# Patient Record
Sex: MALE | Race: WHITE | NOT HISPANIC OR LATINO | Employment: OTHER | RURAL
[De-identification: names, ages, dates, MRNs, and addresses within clinical notes are randomized per-mention and may not be internally consistent; named-entity substitution may affect disease eponyms.]

---

## 2017-01-09 ENCOUNTER — OFFICE VISIT (OUTPATIENT)
Dept: FAMILY MEDICINE CLINIC | Facility: CLINIC | Age: 77
End: 2017-01-09

## 2017-01-09 VITALS
HEIGHT: 72 IN | SYSTOLIC BLOOD PRESSURE: 116 MMHG | DIASTOLIC BLOOD PRESSURE: 80 MMHG | BODY MASS INDEX: 23 KG/M2 | HEART RATE: 65 BPM | WEIGHT: 169.8 LBS | OXYGEN SATURATION: 95 % | TEMPERATURE: 97.2 F

## 2017-01-09 DIAGNOSIS — S01.301A OPEN WOUND OF RIGHT EAR, UNSPECIFIED OPEN WOUND TYPE, INITIAL ENCOUNTER: ICD-10-CM

## 2017-01-09 DIAGNOSIS — S01.302A OPEN WOUND OF LEFT EAR, UNSPECIFIED OPEN WOUND TYPE, INITIAL ENCOUNTER: Primary | ICD-10-CM

## 2017-01-09 PROCEDURE — 99213 OFFICE O/P EST LOW 20 MIN: CPT | Performed by: NURSE PRACTITIONER

## 2017-01-09 NOTE — MR AVS SNAPSHOT
Larry Santos   1/9/2017 2:00 PM   Office Visit    Dept Phone:  [phone]   Encounter #:  [id number]    Provider:  ISAAC Andre   Department:  [facility] FAMILY MEDICINE                Your Full Care Plan              Today's Medication Changes          These changes are accurate as of: 1/9/17  3:05 PM.  If you have any questions, ask your nurse or doctor.               New Medication(s)Ordered:     mupirocin 2 % ointment   Commonly known as:  BACTROBAN   Apply  topically 3 (Three) Times a Day.   Started by:  ISAAC Andre            Where to Get Your Medications      These medications were sent to [place], [place] - [address] - [phone] [facility] [phone]   [address] [facility] [address] [facility] [address]     Phone:  [phone]     mupirocin 2 % ointment                  Your Updated Medication List          This list is accurate as of: 1/9/17  3:05 PM.  Always use your most recent med list.                aspirin 81 MG EC tablet       atorvastatin 10 MG tablet   Commonly known as:  LIPITOR   Take 1 tablet by mouth Daily.       carvedilol 6.25 MG tablet   Commonly known as:  COREG   Take 1 tablet by mouth 2 (Two) Times a Day.       hydrochlorothiazide 25 MG tablet   Commonly known as:  HYDRODIURIL       mupirocin 2 % ointment   Commonly known as:  BACTROBAN   Apply  topically 3 (Three) Times a Day.       ramipril 5 MG capsule   Commonly known as:  ALTACE   Take 1 capsule by mouth Daily.               You Were Diagnosed With        Codes Comments    Open wound of left ear, unspecified open wound type, initial encounter    -  Primary ICD-10-CM: S01.302A  ICD-9-CM: 872.8     Open wound of right ear, unspecified open wound type, initial encounter     ICD-10-CM: S01.301A  ICD-9-CM: 872.8       Instructions     None    Patient Instructions History      Upcoming Appointments     Visit Type Date Time Department    OFFICE VISIT 1/9/2017   2:00 PM [facility]    FOLLOW UP 2017 11:00 AM [facility]    FOLLOW UP 2017  8:40 AM [facility]    FOLLOW UP 2017 11:15 AM [facility] Signup     [facility] allows you to send messages to your doctor, view your test results, renew your prescriptions, schedule appointments, and more. To sign up, go to [employer] and click on the Sign Up Now link in the New User? box. Enter your [facility] Activation Code exactly as it appears below along with the last four digits of your Social Security Number and your Date of Birth () to complete the sign-up process. If you do not sign up before the expiration date, you must request a new code.    [facility] Activation Code: [email or web address]  Expires: 2017  3:05 PM    If you have questions, you can email [email or web address] or call [phone] to talk to our [facility] staff. Remember, [facility] is NOT to be used for urgent needs. For medical emergencies, dial 911.               Other Info from Your Visit           Your Appointments     2017 11:00 AM EST   Follow Up with ISAAC Andre   [facility] FAMILY MEDICINE (--)    [address] [address]   [phone]           Arrive 15 minutes prior to appointment.            2017  8:40 AM EDT   Follow Up with ISAAC Andre   [facility] FAMILY MEDICINE (--)    [address] [address]   [phone]           Arrive 15 minutes prior to appointment.            2017 11:15 AM EDT   Follow Up with Delonte Ramirez MD   [facility] CARDIOLOGY (--)    [address]  [place] [address]   [phone]           Arrive 15 minutes prior to appointment.              Allergies     Penicillins        Reason for Visit     ear lesion           Vital Signs     Blood Pressure Pulse Temperature Height  "Weight Oxygen Saturation    116/80 (BP Location: Left arm, Patient Position: Sitting) 65 97.2 °F (36.2 °C) (Oral) 72\" (182.9 cm) 169 lb 12.8 oz (77 kg) 95%    Body Mass Index Smoking Status                23.03 kg/m2 Former Smoker          Problems and Diagnoses Noted     Open wound of ear    -  Primary    Open wound of ear            "

## 2017-01-09 NOTE — PROGRESS NOTES
"Subjective   Larry Santos is a 76 y.o. male.   Chief Complaint   Patient presents with   • ear lesion     History of Present Illness     The patient presents today with c/o bilateral ear lesion. This is located on the posterior auricle of bilateral ears. Originally started as a \"pimple.\" Denies trauma. Reports he squeezed and drained the lesion multiple times. The pain has progressed. He has been washing vigorously at home multiple times a day. Has noted clear and bloody drainage. He denies fever and chills. No malaise or fatigue. Has not tried any treatment. Overall, this is worsening.    The following portions of the patient's history were reviewed and updated as appropriate: allergies, current medications, past family history, past medical history, past social history, past surgical history and problem list.  Visit Vitals   • /80 (BP Location: Left arm, Patient Position: Sitting)   • Pulse 65   • Temp 97.2 °F (36.2 °C) (Oral)   • Ht 72\" (182.9 cm)   • Wt 169 lb 12.8 oz (77 kg)   • SpO2 95%  Comment: Room air   • BMI 23.03 kg/m2     Review of Systems   Constitutional: Negative for chills, fatigue and fever.   HENT: Negative for congestion, ear pain, rhinorrhea and sore throat.    Respiratory: Negative for cough, shortness of breath and wheezing.    Cardiovascular: Negative for chest pain, palpitations and leg swelling.   Gastrointestinal: Negative for abdominal pain, diarrhea, nausea and vomiting.   Musculoskeletal: Negative for arthralgias and myalgias.   Skin: Positive for wound. Negative for rash.   Neurological: Negative for dizziness, light-headedness and headaches.   Psychiatric/Behavioral: Negative for sleep disturbance. The patient is not nervous/anxious.        Objective   Physical Exam   Constitutional: He is oriented to person, place, and time. He appears well-developed and well-nourished.   HENT:   Head: Normocephalic and atraumatic.   Eyes: Pupils are equal, round, and reactive to light. "   Cardiovascular: Normal rate, regular rhythm and normal heart sounds.    Pulmonary/Chest: Effort normal and breath sounds normal.   Abdominal: Soft. Bowel sounds are normal.   Musculoskeletal: Normal range of motion.   Neurological: He is alert and oriented to person, place, and time.   Skin:   Erythema and abrasion noted to bilateral posterior ear auricle. Scant serosanguineous drainage noted   Psychiatric: He has a normal mood and affect. His behavior is normal.       Assessment/Plan   Larry was seen today for ear lesion.    Diagnoses and all orders for this visit:    Open wound of left ear, unspecified open wound type, initial encounter    Open wound of right ear, unspecified open wound type, initial encounter    Other orders  -     mupirocin (BACTROBAN) 2 % ointment; Apply  topically 3 (Three) Times a Day.        Will treat with topical Bactroban ointment. Otherwise, keep the area clean and dry. Avoid vigorous scrubbing. Will follow up in 3 days and as needed.

## 2017-01-13 ENCOUNTER — OFFICE VISIT (OUTPATIENT)
Dept: FAMILY MEDICINE CLINIC | Facility: CLINIC | Age: 77
End: 2017-01-13

## 2017-01-13 VITALS
BODY MASS INDEX: 23.07 KG/M2 | DIASTOLIC BLOOD PRESSURE: 87 MMHG | HEIGHT: 72 IN | OXYGEN SATURATION: 96 % | SYSTOLIC BLOOD PRESSURE: 134 MMHG | WEIGHT: 170.3 LBS | TEMPERATURE: 97.6 F | HEART RATE: 67 BPM

## 2017-01-13 DIAGNOSIS — S01.302D OPEN WOUND OF LEFT EAR, UNSPECIFIED OPEN WOUND TYPE, SUBSEQUENT ENCOUNTER: Primary | ICD-10-CM

## 2017-01-13 DIAGNOSIS — S01.301D OPEN WOUND OF RIGHT EAR, UNSPECIFIED OPEN WOUND TYPE, SUBSEQUENT ENCOUNTER: ICD-10-CM

## 2017-01-13 PROCEDURE — 99213 OFFICE O/P EST LOW 20 MIN: CPT | Performed by: NURSE PRACTITIONER

## 2017-01-13 NOTE — MR AVS SNAPSHOT
Larry DEL VALLE Tom   2017 11:00 AM   Office Visit    Dept Phone:  [phone]   Encounter #:  [id number]    Provider:  ISAAC Andre   Department:  [facility] FAMILY MEDICINE                Your Full Care Plan              Your Updated Medication List          This list is accurate as of: 17 11:08 AM.  Always use your most recent med list.                aspirin 81 MG EC tablet       atorvastatin 10 MG tablet   Commonly known as:  LIPITOR   Take 1 tablet by mouth Daily.       carvedilol 6.25 MG tablet   Commonly known as:  COREG   Take 1 tablet by mouth 2 (Two) Times a Day.       hydrochlorothiazide 25 MG tablet   Commonly known as:  HYDRODIURIL       mupirocin 2 % ointment   Commonly known as:  BACTROBAN   Apply  topically 3 (Three) Times a Day.       ramipril 5 MG capsule   Commonly known as:  ALTACE   Take 1 capsule by mouth Daily.               You Were Diagnosed With        Codes Comments    Open wound of left ear, unspecified open wound type, subsequent encounter    -  Primary ICD-10-CM: S01.302D  ICD-9-CM: 872.8     Open wound of right ear, unspecified open wound type, subsequent encounter     ICD-10-CM: S01.301D  ICD-9-CM: 872.8       Instructions     None    Patient Instructions History      Upcoming Appointments     Visit Type Date Time Department    FOLLOW UP 2017 11:00 AM [facility]    FOLLOW UP 2017  8:40 AM [facility]    FOLLOW UP 2017 11:15 AM [facility] TRESSA RAMACHANDRAN      [facility] Signup     [facility] [employer] allows you to send messages to your doctor, view your test results, renew your prescriptions, schedule appointments, and more. To sign up, go to [employer] and click on the Sign Up Now link in the New User? box. Enter your [employer] Activation Code exactly as it appears below along with the last four digits of your Social Security Number and your Date of Birth () to complete the sign-up  "process. If you do not sign up before the expiration date, you must request a new code.    MyChart Activation Code: [email or web address]  Expires: 1/23/2017  3:05 PM    If you have questions, you can email [email or web address] or call [phone] to talk to our MyChart staff. Remember, MyChart is NOT to be used for urgent needs. For medical emergencies, dial 911.               Other Info from Your Visit           Your Appointments     Apr 14, 2017  8:40 AM EDT   Follow Up with ISAAC Andre   [facility] FAMILY MEDICINE (--)    [address] [address] [phone]           Arrive 15 minutes prior to appointment.            Jul 06, 2017 11:15 AM EDT   Follow Up with Delonte Ramirez MD   [facility] CARDIOLOGY (--)    [address] [address] [phone]           Arrive 15 minutes prior to appointment.              Allergies     Penicillins        Reason for Visit     Wound Check           Vital Signs     Blood Pressure Pulse Temperature Height Weight Oxygen Saturation    134/87 (BP Location: Left arm, Patient Position: Sitting) 67 97.6 °F (36.4 °C) (Oral) 72\" (182.9 cm) 170 lb 4.8 oz (77.2 kg) 96%    Body Mass Index Smoking Status                23.1 kg/m2 Former Smoker          Problems and Diagnoses Noted     Open wound of ear    -  Primary    Open wound of ear            "

## 2017-01-13 NOTE — PROGRESS NOTES
"Subjective   Larry Santos is a 76 y.o. male.   Chief Complaint   Patient presents with   • Wound Check     History of Present Illness     The patient presents today for follow-up regarding open wounds on the posterior aspect of bilateral ear auricle.  He has been applying the Bactroban cream routinely.  Reports the wounds haven't nearly resolved.  Has had no drainage from the right ear.  Has had a scant amount of drainage from the left ear.  Is keeping the area clean and dry.  Denies fever and chills.  Overall, this is improving.    The following portions of the patient's history were reviewed and updated as appropriate: allergies, current medications, past family history, past medical history, past social history, past surgical history and problem list.  Visit Vitals   • /87 (BP Location: Left arm, Patient Position: Sitting)   • Pulse 67   • Temp 97.6 °F (36.4 °C) (Oral)   • Ht 72\" (182.9 cm)   • Wt 170 lb 4.8 oz (77.2 kg)   • SpO2 96%  Comment: Room air   • BMI 23.1 kg/m2     Review of Systems   Constitutional: Negative for chills, fatigue and fever.   HENT: Negative for congestion, ear pain, rhinorrhea and sore throat.    Respiratory: Negative for cough, shortness of breath and wheezing.    Cardiovascular: Negative for chest pain, palpitations and leg swelling.   Gastrointestinal: Negative for abdominal pain, diarrhea, nausea and vomiting.   Musculoskeletal: Negative for back pain and myalgias.   Skin: Positive for wound. Negative for rash.   Neurological: Negative for dizziness, light-headedness and headaches.   Psychiatric/Behavioral: Negative for sleep disturbance. The patient is not nervous/anxious.        Objective   Physical Exam   Constitutional: He is oriented to person, place, and time. He appears well-developed and well-nourished.   HENT:   Head: Normocephalic and atraumatic.   Pulmonary/Chest: Effort normal.   Neurological: He is alert and oriented to person, place, and time.   Skin: Skin is " warm and dry.   Abrasion and erythema noted to bilateral posterior ear auricle. No drainage noted.   Psychiatric: He has a normal mood and affect. His behavior is normal.       Assessment/Plan   Larry was seen today for wound check.    Diagnoses and all orders for this visit:    Open wound of left ear, unspecified open wound type, subsequent encounter    Open wound of right ear, unspecified open wound type, subsequent encounter      The wound is healing well. Will continue the current medication. Continue to keep the ears clean and dry. Follow up as scheduled and as needed.

## 2017-03-16 ENCOUNTER — OFFICE VISIT (OUTPATIENT)
Dept: FAMILY MEDICINE CLINIC | Facility: CLINIC | Age: 77
End: 2017-03-16

## 2017-03-16 VITALS
DIASTOLIC BLOOD PRESSURE: 78 MMHG | HEART RATE: 77 BPM | SYSTOLIC BLOOD PRESSURE: 120 MMHG | WEIGHT: 165 LBS | BODY MASS INDEX: 22.35 KG/M2 | HEIGHT: 72 IN | TEMPERATURE: 97.6 F | OXYGEN SATURATION: 94 %

## 2017-03-16 DIAGNOSIS — J44.1 COPD WITH ACUTE EXACERBATION (HCC): Primary | ICD-10-CM

## 2017-03-16 PROCEDURE — 99213 OFFICE O/P EST LOW 20 MIN: CPT | Performed by: NURSE PRACTITIONER

## 2017-03-16 RX ORDER — DOXYCYCLINE HYCLATE 100 MG/1
100 CAPSULE ORAL 2 TIMES DAILY
Qty: 20 CAPSULE | Refills: 0 | Status: SHIPPED | OUTPATIENT
Start: 2017-03-16 | End: 2017-04-14

## 2017-03-16 NOTE — PROGRESS NOTES
"Latrell Santos is a 76 y.o. male.   Chief Complaint   Patient presents with   • URI     URI    This is a new problem. The current episode started in the past 7 days. The problem has been waxing and waning. There has been no fever. Associated symptoms include congestion, coughing, headaches, rhinorrhea and a sore throat. Pertinent negatives include no abdominal pain, chest pain, diarrhea, dysuria, ear pain, nausea, rash, sinus pain, swollen glands, vomiting or wheezing. He has tried nothing for the symptoms. The treatment provided no relief.        The following portions of the patient's history were reviewed and updated as appropriate: allergies, current medications, past family history, past medical history, past social history, past surgical history and problem list.  Visit Vitals   • /78 (BP Location: Right arm, Patient Position: Sitting)   • Pulse 77   • Temp 97.6 °F (36.4 °C) (Oral)   • Ht 72\" (182.9 cm)   • Wt 165 lb (74.8 kg)   • SpO2 94%  Comment: Room air   • BMI 22.38 kg/m2     Review of Systems   Constitutional: Negative for chills, fatigue and fever.   HENT: Positive for congestion, rhinorrhea and sore throat. Negative for ear pain.    Respiratory: Positive for cough. Negative for shortness of breath and wheezing.    Cardiovascular: Negative for chest pain, palpitations and leg swelling.   Gastrointestinal: Negative for abdominal pain, diarrhea, nausea and vomiting.   Genitourinary: Negative for dysuria.   Musculoskeletal: Negative for back pain and myalgias.   Skin: Negative for rash and wound.   Neurological: Positive for headaches. Negative for dizziness and light-headedness.   Psychiatric/Behavioral: Negative for sleep disturbance. The patient is not nervous/anxious.        Objective   Physical Exam   Constitutional: He is oriented to person, place, and time. He appears well-developed and well-nourished.   HENT:   Head: Normocephalic and atraumatic.   Right Ear: External ear " normal.   Left Ear: External ear normal.   Oropharynx with PND   Cardiovascular: Normal rate, regular rhythm and normal heart sounds.    Pulmonary/Chest: Effort normal and breath sounds normal.   Abdominal: Soft. Bowel sounds are normal.   Musculoskeletal: Normal range of motion.   Lymphadenopathy:     He has no cervical adenopathy.   Neurological: He is alert and oriented to person, place, and time.   Skin: Skin is warm and dry.   Psychiatric: He has a normal mood and affect. His behavior is normal.       Assessment/Plan   Larry was seen today for uri.    Diagnoses and all orders for this visit:    COPD with acute exacerbation  -     doxycycline (VIBRAMYCIN) 100 MG capsule; Take 1 capsule by mouth 2 (Two) Times a Day.        Will treat with antibiotic as noted. He has tolerated well in the past. Administration and SE discussed. Supportive measures encouraged. Follow up in 2-3 days if no improvement or if symptoms worsen.

## 2017-04-14 ENCOUNTER — OFFICE VISIT (OUTPATIENT)
Dept: FAMILY MEDICINE CLINIC | Facility: CLINIC | Age: 77
End: 2017-04-14

## 2017-04-14 VITALS
BODY MASS INDEX: 22.44 KG/M2 | SYSTOLIC BLOOD PRESSURE: 131 MMHG | DIASTOLIC BLOOD PRESSURE: 87 MMHG | WEIGHT: 165.7 LBS | TEMPERATURE: 96.5 F | OXYGEN SATURATION: 93 % | HEIGHT: 72 IN | HEART RATE: 64 BPM

## 2017-04-14 DIAGNOSIS — J44.1 CHRONIC OBSTRUCTIVE PULMONARY DISEASE WITH ACUTE EXACERBATION (HCC): ICD-10-CM

## 2017-04-14 DIAGNOSIS — E78.00 HYPERCHOLESTEROLEMIA: ICD-10-CM

## 2017-04-14 DIAGNOSIS — I48.0 PAROXYSMAL ATRIAL FIBRILLATION (HCC): ICD-10-CM

## 2017-04-14 DIAGNOSIS — Z00.00 INITIAL MEDICARE ANNUAL WELLNESS VISIT: Primary | ICD-10-CM

## 2017-04-14 DIAGNOSIS — I10 ESSENTIAL HYPERTENSION: ICD-10-CM

## 2017-04-14 PROCEDURE — G0438 PPPS, INITIAL VISIT: HCPCS | Performed by: NURSE PRACTITIONER

## 2017-04-14 PROCEDURE — 99213 OFFICE O/P EST LOW 20 MIN: CPT | Performed by: NURSE PRACTITIONER

## 2017-04-14 PROCEDURE — 96160 PT-FOCUSED HLTH RISK ASSMT: CPT | Performed by: NURSE PRACTITIONER

## 2017-04-14 RX ORDER — INDOMETHACIN 25 MG/1
25 CAPSULE ORAL 3 TIMES DAILY PRN
COMMUNITY
End: 2018-09-06

## 2017-04-14 NOTE — PROGRESS NOTES
"Subjective   Larry Santos is a 76 y.o. male.   Chief Complaint   Patient presents with   • Hypertension     History of Present Illness     The patient presents today for routine follow-up.  His blood pressure stable in the office today.  Tolerates medications well.  [facility]  Has lab with him today.  PSA, TSH, CBC, CMP, lipid, vitamin B-12, and vitamin D levels are unremarkable.  Reports [facility] dose [facility] of the chest to be done next month. He continues to follow at least yearly with cardiology.  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  Today, he complains of persistent fatigue.  States he wakes up feeling he has not rested.  Also feels he can nap all day.  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  He does not know if he snores at night but does deny known periods of apnea.  Overall, this is variable.    The following portions of the patient's history were reviewed and updated as appropriate: allergies, current medications, past family history, past medical history, past social history, past surgical history and problem list.  /87 (BP Location: Left arm, Patient Position: Sitting)  Pulse 64  Temp 96.5 °F (35.8 °C) (Oral)   Ht 72\" (182.9 cm)  Wt 165 lb 11.2 oz (75.2 kg)  SpO2 93% Comment: Room air  BMI 22.47 kg/m2  Review of Systems   Constitutional: Positive for fatigue. Negative for chills and fever.   HENT: Negative for congestion, ear pain, rhinorrhea and sore throat.    Respiratory: Positive for shortness of breath. Negative for cough and wheezing.    Cardiovascular: Negative for chest pain, palpitations and leg swelling.   Gastrointestinal: Negative for abdominal pain, diarrhea, nausea and vomiting.   Genitourinary: Negative for dysuria.   Musculoskeletal: Negative for back pain and myalgias.   Skin: Negative for rash and wound.   Neurological: Negative for dizziness, light-headedness and headaches.   Psychiatric/Behavioral: Negative for sleep " disturbance. The patient is not nervous/anxious.        Objective   Physical Exam   Constitutional: He is oriented to person, place, and time. He appears well-developed and well-nourished.   HENT:   Head: Normocephalic and atraumatic.   Cardiovascular: Normal rate, regular rhythm and normal heart sounds.    Pulmonary/Chest: Effort normal and breath sounds normal.   Abdominal: Soft. Bowel sounds are normal.   Musculoskeletal: Normal range of motion.   Neurological: He is alert and oriented to person, place, and time.   Skin: Skin is warm and dry.   Psychiatric: He has a normal mood and affect. His behavior is normal.       Assessment/Plan   Larry was seen today for hypertension.    Diagnoses and all orders for this visit:    Initial Medicare annual wellness visit    Paroxysmal atrial fibrillation    Hypercholesterolemia    Essential hypertension    Chronic obstructive pulmonary disease with acute exacerbation      Will continue the current medication regimen. Continue follow up with cardiology. We have discussed causes of fatigue. His vitamin B12 and vitamin D levels were adequate. I cannot rule out sleep apnea. He has declined referral to sleep medicine, but will consider this in the future. Follow up in 6 months and PRN.

## 2017-04-14 NOTE — PROGRESS NOTES
QUICK REFERENCE INFORMATION:  The ABCs of the Annual Wellness Visit    Initial Medicare Wellness Visit    HEALTH RISK ASSESSMENT    1940    Recent Hospitalizations:  No recent hospitalization(s)..        Current Medical Providers:  Patient Care Team:  Rodríguez Banks MD as PCP - General        Smoking Status:  History   Smoking Status   • Former Smoker   • Years: 40.00   Smokeless Tobacco   • Never Used     Comment: quit one year ago       Alcohol Consumption:  History   Alcohol Use No       Depression Screen:   PHQ-9 Depression Screening 4/14/2017   Little interest or pleasure in doing things 0   Feeling down, depressed, or hopeless 0   Trouble falling or staying asleep, or sleeping too much 0   Feeling tired or having little energy 3   Poor appetite or overeating 0   Feeling bad about yourself - or that you are a failure or have let yourself or your family down 0   Trouble concentrating on things, such as reading the newspaper or watching television 0   Moving or speaking so slowly that other people could have noticed. Or the opposite - being so fidgety or restless that you have been moving around a lot more than usual 0   Thoughts that you would be better off dead, or of hurting yourself in some way 0   PHQ-9 Total Score 3       Health Habits and Functional and Cognitive Screening:  Functional & Cognitive Status 4/14/2017   Do you have difficulty preparing food and eating? No   Do you have difficulty bathing yourself? No   Do you have difficulty getting dressed? No   Do you have difficulty using the toilet? No   Do you have difficulty moving around from place to place? No   In the past year have you fallen or experienced a near fall? No   Do you need help using the phone?  No   Are you deaf or do you have serious difficulty hearing?  Yes   Do you need help with transportation? No   Do you need help shopping? No   Do you need help preparing meals?  No   Do you need help with housework?  No   Do you  need help with laundry? No   Do you need help taking your medications? No   Do you need help managing money? No   Do you have difficulty concentrating, remembering or making decisions? No       Health Habits  Current Diet: Well Balanced Diet  Dental Exam: Up to date  Eye Exam: Up to date  Exercise (times per week): 7 times per week  Current Exercise Activities Include:  (work on farm)          Does the patient have evidence of cognitive impairment? No    Asiprin use counseling: Taking ASA appropriately as indicated      Recent Lab Results:    Visual Acuity:  No exam data present    Age-appropriate Screening Schedule:  Refer to the list below for future screening recommendations based on patient's age, sex and/or medical conditions. Orders for these recommended tests are listed in the plan section. The patient has been provided with a written plan.    Health Maintenance   Topic Date Due   • LIPID PANEL  07/05/2017   • PNEUMOCOCCAL VACCINES (65+ LOW/MEDIUM RISK) (2 of 2 - PPSV23) 04/04/2018   • COLONOSCOPY  07/09/2023   • TDAP/TD VACCINES (2 - Td) 04/04/2027   • INFLUENZA VACCINE  Completed   • ZOSTER VACCINE  Completed        Subjective   History of Present Illness    Larry Santos is a 76 y.o. male who presents for an Annual Wellness Visit.    The following portions of the patient's history were reviewed and updated as appropriate: allergies, current medications, past family history, past medical history, past social history, past surgical history and problem list.    Outpatient Medications Prior to Visit   Medication Sig Dispense Refill   • aspirin 81 MG EC tablet Take 81 mg by mouth daily.     • atorvastatin (LIPITOR) 10 MG tablet Take 1 tablet by mouth Daily. 90 tablet 3   • carvedilol (COREG) 6.25 MG tablet Take 1 tablet by mouth 2 (Two) Times a Day. 180 tablet 3   • hydrochlorothiazide (HYDRODIURIL) 25 MG tablet Take 12.5 mg by mouth daily.     • ramipril (ALTACE) 5 MG capsule Take 1 capsule by mouth Daily. 90  "capsule 3   • doxycycline (VIBRAMYCIN) 100 MG capsule Take 1 capsule by mouth 2 (Two) Times a Day. 20 capsule 0   • mupirocin (BACTROBAN) 2 % ointment Apply  topically 3 (Three) Times a Day. 1 g 0     No facility-administered medications prior to visit.        Patient Active Problem List   Diagnosis   • Paroxysmal atrial fibrillation   • Chronic obstructive pulmonary disease   • Chronic coronary artery disease   • Hypercholesterolemia   • Hypertension   • Coronary artery disease   • Thrombocytopenia       Advance Care Planning:  has NO advance directive - not interested in additional information    Identification of Risk Factors:  Risk factors include: cardiovascular risk.    Review of Systems    Compared to one year ago, the patient feels his physical health is the same.  Compared to one year ago, the patient feels his mental health is the same.    Objective     Physical Exam    Vitals:    04/14/17 0835   BP: 131/87   BP Location: Left arm   Patient Position: Sitting   Pulse: 64   Temp: 96.5 °F (35.8 °C)   TempSrc: Oral   SpO2: 93%   Weight: 165 lb 11.2 oz (75.2 kg)   Height: 72\" (182.9 cm)   PainSc:   4   PainLoc: Back       Body mass index is 22.47 kg/(m^2).  Discussed the patient's BMI with him. The BMI is in the acceptable range.    Assessment/Plan   Patient Self-Management and Personalized Health Advice  The patient has been provided with information about: exercise and preventive services including:   · Exercise counseling provided, Nutrition counseling provided.    Visit Diagnoses:    ICD-10-CM ICD-9-CM   1. Initial Medicare annual wellness visit Z00.00 V70.0   2. Paroxysmal atrial fibrillation I48.0 427.31   3. Hypercholesterolemia E78.00 272.0   4. Essential hypertension I10 401.9   5. Chronic obstructive pulmonary disease with acute exacerbation J44.1 491.21       No orders of the defined types were placed in this encounter.      Outpatient Encounter Prescriptions as of 4/14/2017   Medication Sig Dispense " Refill   • aspirin 81 MG EC tablet Take 81 mg by mouth daily.     • atorvastatin (LIPITOR) 10 MG tablet Take 1 tablet by mouth Daily. 90 tablet 3   • carvedilol (COREG) 6.25 MG tablet Take 1 tablet by mouth 2 (Two) Times a Day. 180 tablet 3   • hydrochlorothiazide (HYDRODIURIL) 25 MG tablet Take 12.5 mg by mouth daily.     • indomethacin (INDOCIN) 25 MG capsule Take 25 mg by mouth 3 (Three) Times a Day As Needed for Mild Pain (1-3).     • ramipril (ALTACE) 5 MG capsule Take 1 capsule by mouth Daily. 90 capsule 3   • [DISCONTINUED] doxycycline (VIBRAMYCIN) 100 MG capsule Take 1 capsule by mouth 2 (Two) Times a Day. 20 capsule 0   • [DISCONTINUED] mupirocin (BACTROBAN) 2 % ointment Apply  topically 3 (Three) Times a Day. 1 g 0     No facility-administered encounter medications on file as of 4/14/2017.        Reviewed use of high risk medication in the elderly: yes  Reviewed for potential of harmful drug interactions in the elderly: yes    Follow Up:  Return in about 6 months (around 10/14/2017) for Recheck.     An After Visit Summary and PPPS with all of these plans were given to the patient.

## 2017-06-16 ENCOUNTER — OFFICE VISIT (OUTPATIENT)
Dept: FAMILY MEDICINE CLINIC | Facility: CLINIC | Age: 77
End: 2017-06-16

## 2017-06-16 VITALS
WEIGHT: 165 LBS | OXYGEN SATURATION: 96 % | HEIGHT: 72 IN | BODY MASS INDEX: 22.35 KG/M2 | SYSTOLIC BLOOD PRESSURE: 130 MMHG | HEART RATE: 72 BPM | DIASTOLIC BLOOD PRESSURE: 82 MMHG

## 2017-06-16 DIAGNOSIS — J41.0 SIMPLE CHRONIC BRONCHITIS (HCC): ICD-10-CM

## 2017-06-16 DIAGNOSIS — S29.011A CHEST WALL MUSCLE STRAIN, INITIAL ENCOUNTER: Primary | ICD-10-CM

## 2017-06-16 PROCEDURE — 99213 OFFICE O/P EST LOW 20 MIN: CPT | Performed by: NURSE PRACTITIONER

## 2017-06-16 RX ORDER — DICLOFENAC SODIUM 75 MG/1
75 TABLET, DELAYED RELEASE ORAL 2 TIMES DAILY
COMMUNITY
End: 2017-07-06

## 2017-06-16 RX ORDER — CYCLOBENZAPRINE HCL 10 MG
10 TABLET ORAL 3 TIMES DAILY PRN
COMMUNITY
End: 2017-07-06

## 2017-06-16 NOTE — PROGRESS NOTES
"Latrell Santos is a 76 y.o. male.   Chief Complaint   Patient presents with   • Muscle Pain     History of Present Illness     The patient presents today for ER follow up. Went to the ER 5 days ago with c/o left chest wall pain. 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 After working for several hours began to have a soreness in his left chest. This is worse with moving his arm and taking deep breaths. He reportedly had a workup in the [facility] ER with EKG, Chest X-ray, and lab. These records are not available for review today. He states a cardiac etiology was ruled out. He was diagnosed with chest wall strain. He was prescribed Diclofenac and Flexeril. He has been taking the medication and symptoms are gradually improving. Continues to have the pain with certain arm movements and deep breaths. He is tolerating medication well. Is resting from vigorous activity. This is improving. He states he has also noticed he is having more shortness of breath with activities. He has been off of the Spiriva for nearly a year. Does not want to use any inhalers. This medication was effective when he was using it every day.    The following portions of the patient's history were reviewed and updated as appropriate: allergies, current medications, past family history, past medical history, past social history, past surgical history and problem list.  /82  Pulse 72  Ht 72\" (182.9 cm)  Wt 165 lb (74.8 kg)  SpO2 96%  BMI 22.38 kg/m2  Review of Systems   Constitutional: Negative for chills, fatigue and fever.   HENT: Negative for congestion, ear pain, rhinorrhea and sore throat.    Respiratory: Positive for shortness of breath. Negative for cough and wheezing.    Cardiovascular: Negative for chest pain, palpitations and leg swelling.   Gastrointestinal: Negative for abdominal pain, diarrhea, nausea and vomiting.   Genitourinary: Negative for dysuria.   Musculoskeletal: Positive for " myalgias. Negative for back pain.   Skin: Negative for rash and wound.   Neurological: Negative for dizziness, light-headedness and headaches.   Psychiatric/Behavioral: Negative for sleep disturbance. The patient is not nervous/anxious.        Objective   Physical Exam   Constitutional: He is oriented to person, place, and time. He appears well-developed and well-nourished.   HENT:   Head: Normocephalic and atraumatic.   Cardiovascular: Normal rate, regular rhythm and normal heart sounds.    Pulmonary/Chest: Effort normal and breath sounds normal.   Abdominal: Soft. Bowel sounds are normal.   Musculoskeletal:   Full ROM left arm. Left chest wall pain with extension of the arm. Left chest wall tender to palpation   Neurological: He is alert and oriented to person, place, and time.   Skin: Skin is warm and dry.   Psychiatric: He has a normal mood and affect. His behavior is normal.       Assessment/Plan   Larry was seen today for muscle pain.    Diagnoses and all orders for this visit:    Chest wall muscle strain, initial encounter      The muscle strain is gradually improving. He will continue the current medications. Continue to rest and avoid heavy lifting. We have also discussed the COPD and need for inhaler. He has declined. Will let us know if he chooses to do this in the future. Follow up in 3-5 days if the chest wall pain does not resolve and when needed.

## 2017-07-06 ENCOUNTER — OFFICE VISIT (OUTPATIENT)
Dept: CARDIOLOGY | Facility: CLINIC | Age: 77
End: 2017-07-06

## 2017-07-06 VITALS
BODY MASS INDEX: 21.1 KG/M2 | SYSTOLIC BLOOD PRESSURE: 114 MMHG | DIASTOLIC BLOOD PRESSURE: 76 MMHG | HEART RATE: 89 BPM | HEIGHT: 74 IN | WEIGHT: 164.4 LBS

## 2017-07-06 DIAGNOSIS — I10 ESSENTIAL HYPERTENSION: ICD-10-CM

## 2017-07-06 DIAGNOSIS — E78.2 MIXED HYPERLIPIDEMIA: ICD-10-CM

## 2017-07-06 DIAGNOSIS — I25.10 CORONARY ARTERY DISEASE INVOLVING NATIVE CORONARY ARTERY OF NATIVE HEART WITHOUT ANGINA PECTORIS: Primary | ICD-10-CM

## 2017-07-06 PROCEDURE — 99214 OFFICE O/P EST MOD 30 MIN: CPT | Performed by: INTERNAL MEDICINE

## 2017-07-06 NOTE — PROGRESS NOTES
[facility]  Office Progress Note  Larry Santos  1940  [phone]      Visit Date: 07/06/2017    PCP: Rodríguez Banks MD  [address] [address]    IDENTIFICATION: A 76 y.o. 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     Chief Complaint   Patient presents with   • Follow-up   • Shortness of Breath   • Fatigue   • Coronary Artery Disease   • Atrial Fibrillation       PROBLEM LIST:   1. Coronary artery disease:  a. Status post stenting x3 with myocardial infarction in [place], [place] (Dr. West) 2003 (data deficit).  b. Cardiolite stress test 11/16/2010, Dr. Singh. Normal perfusion with no ischemic, LVEF of 63%.  c. Echocardiogram 11/16/2010, mild LVH with ejection fraction of 60%, trace MR.  d. Carotid duplex 10/09/2009, with normal scan.  2. Paroxysmal atrial fibrillation.  a. CHADS-1  b. On chronic amiodarone and Coumadin therapy. Followed by Dr. Banks  3. Hypertension.  4. Hyperlipidemia.  5. COPD.  6. History of 40 year tobacco use, quit 2013.  7. Dyslipidemia.  a. March 2013: Total 102, triglycerides 124, HDL 40, LDL 38.   8. Thrombocytopenia - per Dr. Banks.   9. Surgical history:  a. Appendectomy in 1957.  b. Hemorrhoidectomy in 1987.  c. Hernia repair x2 in 1980 and 1990.     Allergies  Allergies   Allergen Reactions   • Penicillins        Current Medications    Current Outpatient Prescriptions:   •  aspirin 81 MG EC tablet, Take 81 mg by mouth daily., Disp: , Rfl:   •  atorvastatin (LIPITOR) 10 MG tablet, Take 1 tablet by mouth Daily., Disp: 90 tablet, Rfl: 3  •  carvedilol (COREG) 6.25 MG tablet, Take 1 tablet by mouth 2 (Two) Times a Day., Disp: 180 tablet, Rfl: 3  •  hydrochlorothiazide (HYDRODIURIL) 25 MG tablet, Take 12.5 mg by mouth daily., Disp: , Rfl:   •  indomethacin (INDOCIN) 25 MG capsule, Take 25 mg by mouth 3 (Three) Times a Day As Needed for Mild Pain (1-3)., Disp: , Rfl:   •  ramipril (ALTACE) 5 MG  "capsule, Take 1 capsule by mouth Daily., Disp: 90 capsule, Rfl: 3      History of Present Illness     Pt denies any chest pain, dyspnea, dyspnea on exertion, orthopnea, PND, palpitations, lower extremity edema, or claudication.  Does adl's but no exercise.      ROS:  All systems have been reviewed and are negative with the exception of those mentioned in the HPI.    OBJECTIVE:  Vitals:    07/06/17 1044   BP: 114/76   BP Location: Left arm   Patient Position: Sitting   Pulse: 89   Weight: 164 lb 6.4 oz (74.6 kg)   Height: 74\" (188 cm)     Physical Exam   Constitutional: He appears well-developed and well-nourished.   Neck: Normal range of motion. Neck supple. No hepatojugular reflux and no JVD present. Carotid bruit is not present. No tracheal deviation present. No thyromegaly present.   Cardiovascular: Normal rate, regular rhythm, S1 normal, S2 normal, intact distal pulses and normal pulses.  PMI is not displaced.  Exam reveals no gallop, no distant heart sounds, no friction rub, no midsystolic click and no opening snap.    No murmur heard.  Pulses:       Radial pulses are 2+ on the right side, and 2+ on the left side.        Dorsalis pedis pulses are 2+ on the right side, and 2+ on the left side.        Posterior tibial pulses are 2+ on the right side, and 2+ on the left side.   Pulmonary/Chest: Effort normal and breath sounds normal. He has no wheezes. He has no rales.   Abdominal: Soft. Bowel sounds are normal. He exhibits no mass. There is no tenderness. There is no guarding.       Diagnostic Data:  Procedures      ASSESSMENT:   Diagnosis Plan   1. Coronary artery disease involving native coronary artery of native heart without angina pectoris     2. Essential hypertension     3. Mixed hyperlipidemia         PLAN:  1. Coronary artery disease, remote PCI and stenting. No anginal equivalent.   2. Hypertension,  controlled at current.   3. Dyslipidemia, on statin therapy.   4. Paroxysmal atrial fibrillation, " remotely on warfarin currently. Discontinued with thrombocytopenia.    Rodríguez Banks MD, thank you for referring Mr. Santos for evaluation.  I have forwarded my electronically generated recommendations to you for review.  Please do not hesitate to call with any questions.      Delonte Ramirez MD, FACC

## 2017-10-06 DIAGNOSIS — E78.00 HYPERCHOLESTEREMIA: ICD-10-CM

## 2017-10-06 RX ORDER — ATORVASTATIN CALCIUM 10 MG/1
TABLET, FILM COATED ORAL
Qty: 90 TABLET | Refills: 3 | OUTPATIENT
Start: 2017-10-06

## 2018-01-12 DIAGNOSIS — E78.00 HYPERCHOLESTEREMIA: ICD-10-CM

## 2018-01-12 RX ORDER — ATORVASTATIN CALCIUM 10 MG/1
10 TABLET, FILM COATED ORAL DAILY
Qty: 90 TABLET | Refills: 3 | Status: SHIPPED | OUTPATIENT
Start: 2018-01-12 | End: 2019-01-23 | Stop reason: SDUPTHER

## 2018-09-06 ENCOUNTER — OFFICE VISIT (OUTPATIENT)
Dept: CARDIOLOGY | Facility: CLINIC | Age: 78
End: 2018-09-06

## 2018-09-06 VITALS
HEIGHT: 74 IN | WEIGHT: 155 LBS | DIASTOLIC BLOOD PRESSURE: 92 MMHG | HEART RATE: 70 BPM | SYSTOLIC BLOOD PRESSURE: 137 MMHG | BODY MASS INDEX: 19.89 KG/M2

## 2018-09-06 DIAGNOSIS — I10 ESSENTIAL HYPERTENSION: ICD-10-CM

## 2018-09-06 DIAGNOSIS — I48.0 PAROXYSMAL ATRIAL FIBRILLATION (HCC): ICD-10-CM

## 2018-09-06 DIAGNOSIS — I25.10 CORONARY ARTERY DISEASE INVOLVING NATIVE CORONARY ARTERY OF NATIVE HEART WITHOUT ANGINA PECTORIS: Primary | ICD-10-CM

## 2018-09-06 DIAGNOSIS — E78.2 MIXED HYPERLIPIDEMIA: ICD-10-CM

## 2018-09-06 PROCEDURE — 99213 OFFICE O/P EST LOW 20 MIN: CPT | Performed by: INTERNAL MEDICINE

## 2018-09-06 RX ORDER — ASCORBIC ACID 500 MG
500 TABLET ORAL DAILY
COMMUNITY

## 2018-09-06 RX ORDER — FERROUS GLUCONATE 324(37.5)
TABLET ORAL 2 TIMES DAILY WITH MEALS
COMMUNITY

## 2018-09-06 RX ORDER — DOCUSATE SODIUM 250 MG
250 CAPSULE ORAL DAILY
COMMUNITY

## 2018-09-06 NOTE — PROGRESS NOTES
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     Chief Complaint   Patient presents with   • Shortness of Breath   • Coronary Artery Disease       PROBLEM LIST:   1. Coronary artery disease:  a. Status post stenting x3 with myocardial infarction in [place], [place] (Dr. West) 2003 (data deficit).  b. Cardiolite stress test 11/16/2010, Dr. Singh. Normal perfusion with no ischemic, LVEF of 63%.  c. Echocardiogram 11/16/2010, mild LVH with ejection fraction of 60%, trace MR.  d. Carotid duplex 10/09/2009, with normal scan.  2. Paroxysmal atrial fibrillation.  a. CHADS-1  b. On chronic amiodarone and Coumadin therapy. Followed by Dr. Banks  3. Hypertension.  4. Hyperlipidemia.  5. COPD.  6. History of 40 year tobacco use, quit 2013.  7. Dyslipidemia.  a. March 2013: Total 102, triglycerides 124, HDL 40, LDL 38.   8. Thrombocytopenia - per Dr. Banks.   9. Surgical history:  a. Appendectomy in 1957.  b. Hemorrhoidectomy in 1987.  c. Hernia repair x2 in 1980 and 1990.     Allergies  Allergies   Allergen Reactions   • Penicillins        Current Medications    Current Outpatient Prescriptions:   •  aspirin 81 MG EC tablet, Take 81 mg by mouth daily., Disp: , Rfl:   •  atorvastatin (LIPITOR) 10 MG tablet, Take 1 tablet by mouth Daily., Disp: 90 tablet, Rfl: 3  •  docusate sodium (COLACE) 250 MG capsule, Take 250 mg by mouth Daily., Disp: , Rfl:   •  ferrous gluconate 324 (37.5 Fe) MG tablet tablet, Take  by mouth 2 (Two) Times a Day With Meals., Disp: , Rfl:   •  Fexofenadine HCl (ALLERGY 24-HR PO), Take  by mouth., Disp: , Rfl:   •  vitamin C (ASCORBIC ACID) 500 MG tablet, Take 500 mg by mouth Daily., Disp: , Rfl:       History of Present Illness     Pt denies any  "chest pain, dyspnea, dyspnea on exertion, orthopnea, PND, palpitations, lower extremity edema, or claudication.  Does adl's but no exercise.      ROS:  All systems have been reviewed and are negative with the exception of those mentioned in the HPI.    OBJECTIVE:  Vitals:    09/06/18 1457   BP: 137/92   BP Location: Left arm   Patient Position: Sitting   Pulse: 70   Weight: 70.3 kg (155 lb)   Height: 188 cm (74\")     Physical Exam   Constitutional: He appears well-developed and well-nourished.   Neck: Normal range of motion. Neck supple. No hepatojugular reflux and no JVD present. Carotid bruit is not present. No tracheal deviation present. No thyromegaly present.   Cardiovascular: Normal rate, regular rhythm, S1 normal, S2 normal, intact distal pulses and normal pulses.  PMI is not displaced.  Exam reveals no gallop, no distant heart sounds, no friction rub, no midsystolic click and no opening snap.    No murmur heard.  Pulses:       Radial pulses are 2+ on the right side, and 2+ on the left side.        Dorsalis pedis pulses are 2+ on the right side, and 2+ on the left side.        Posterior tibial pulses are 2+ on the right side, and 2+ on the left side.   Pulmonary/Chest: Effort normal and breath sounds normal. He has no wheezes. He has no rales.   Abdominal: Soft. Bowel sounds are normal. He exhibits no mass. There is no tenderness. There is no guarding.       Diagnostic Data:  Procedures      ASSESSMENT:   Diagnosis Plan   1. Coronary artery disease involving native coronary artery of native heart without angina pectoris     2. Essential hypertension     3. Mixed hyperlipidemia     4. Paroxysmal atrial fibrillation (CMS/HCC)         PLAN:  1. Coronary artery disease, remote PCI and stenting. No anginal equivalent.   2. Hypertension,  controlled at current.   3. Dyslipidemia, on statin therapy.   4. Paroxysmal atrial fibrillation, remotely on warfarin currently. Discontinued with thrombocytopenia.    Darren, " Rodríguez Back MD, thank you for referring Mr. Santos for evaluation.  I have forwarded my electronically generated recommendations to you for review.  Please do not hesitate to call with any questions.    Scribed for Delonte Ramirez MD by Shreya Santoro PA-C. 9/6/2018  3:40 PM  I, Delonte Ramirez MD, personally performed the services described in this documentation as scribed by the above named individual in my presence, and it is both accurate and complete.  9/6/2018  3:40 PM    Delonte Ramirez MD, FACC

## 2018-09-11 ENCOUNTER — OFFICE VISIT (OUTPATIENT)
Dept: FAMILY MEDICINE CLINIC | Facility: CLINIC | Age: 78
End: 2018-09-11

## 2018-09-11 VITALS
WEIGHT: 156.3 LBS | TEMPERATURE: 96.8 F | OXYGEN SATURATION: 97 % | HEART RATE: 76 BPM | DIASTOLIC BLOOD PRESSURE: 87 MMHG | SYSTOLIC BLOOD PRESSURE: 137 MMHG | HEIGHT: 74 IN | BODY MASS INDEX: 20.06 KG/M2

## 2018-09-11 DIAGNOSIS — J06.9 URI, ACUTE: Primary | ICD-10-CM

## 2018-09-11 DIAGNOSIS — I10 ESSENTIAL HYPERTENSION: ICD-10-CM

## 2018-09-11 PROCEDURE — 99213 OFFICE O/P EST LOW 20 MIN: CPT | Performed by: FAMILY MEDICINE

## 2018-09-11 RX ORDER — AZITHROMYCIN 250 MG/1
TABLET, FILM COATED ORAL
Qty: 6 TABLET | Refills: 0 | Status: SHIPPED | OUTPATIENT
Start: 2018-09-11 | End: 2019-11-13

## 2018-09-11 NOTE — PROGRESS NOTES
Subjective   Larry Santos is a 77 y.o. male.     History of Present Illness several days of head cold with some sore throat minimal cough congestion.  Denies nausea vomiting diarrhea urinary symptoms.  Medications are as reconciled.  [facility]  Does also have a respiratory inhaler.  No significant allergy type symptoms.    The following portions of the patient's history were reviewed and updated as appropriate: allergies, past family history, past medical history, past social history, past surgical history and problem list.    Review of Systems see history of present illness    Objective   Physical Exam   Constitutional: He is oriented to person, place, and time. He appears well-developed and well-nourished.   HENT:   Head: Normocephalic and atraumatic.   Nose: Nose normal.   Oropharynx with PND   Neck: Normal range of motion. Neck supple.   Cardiovascular: Normal rate, regular rhythm and normal heart sounds.    Pulmonary/Chest: Effort normal and breath sounds normal.   Lymphadenopathy:     He has no cervical adenopathy.   Neurological: He is alert and oriented to person, place, and time.   Skin: Skin is warm and dry.   Psychiatric: He has a normal mood and affect.       Assessment/Plan   Larry was seen today for uri.    Diagnoses and all orders for this visit:    URI, acute    Essential hypertension    Other orders  -     azithromycin (ZITHROMAX Z-DANIEL) 250 MG tablet; Take 2 tablets the first day, then 1 tablet daily for 4 days.    Meds as directed.  Continue chronics.  Maintain heart healthy diet.  Good oral hand hygiene.  Recheck as needed.  Flu vaccine soon.    Patient's Body mass index is 20.06 kg/m². BMI is within normal parameters. No follow-up required.

## 2018-10-11 ENCOUNTER — FLU SHOT (OUTPATIENT)
Dept: FAMILY MEDICINE CLINIC | Facility: CLINIC | Age: 78
End: 2018-10-11

## 2018-10-11 PROCEDURE — 90662 IIV NO PRSV INCREASED AG IM: CPT | Performed by: FAMILY MEDICINE

## 2018-10-11 PROCEDURE — G0008 ADMIN INFLUENZA VIRUS VAC: HCPCS | Performed by: FAMILY MEDICINE

## 2019-01-07 DIAGNOSIS — E78.00 HYPERCHOLESTEREMIA: ICD-10-CM

## 2019-01-07 RX ORDER — ATORVASTATIN CALCIUM 10 MG/1
TABLET, FILM COATED ORAL
Qty: 90 TABLET | Refills: 3 | OUTPATIENT
Start: 2019-01-07

## 2019-01-23 DIAGNOSIS — E78.00 HYPERCHOLESTEREMIA: ICD-10-CM

## 2019-01-23 RX ORDER — ATORVASTATIN CALCIUM 10 MG/1
10 TABLET, FILM COATED ORAL DAILY
Qty: 90 TABLET | Refills: 3 | Status: SHIPPED | OUTPATIENT
Start: 2019-01-23 | End: 2020-01-02 | Stop reason: SDUPTHER

## 2019-01-23 NOTE — TELEPHONE ENCOUNTER
LAST FILLED ATORVASTATIN  1-12-18 #90 1-12-18    LAST APPOINTMENT: 9-11-18  NEXT APPOINTMENT: NONE

## 2019-10-09 ENCOUNTER — FLU SHOT (OUTPATIENT)
Dept: FAMILY MEDICINE CLINIC | Facility: CLINIC | Age: 79
End: 2019-10-09

## 2019-10-09 DIAGNOSIS — Z23 NEEDS FLU SHOT: Primary | ICD-10-CM

## 2019-10-09 PROCEDURE — G0008 ADMIN INFLUENZA VIRUS VAC: HCPCS | Performed by: NURSE PRACTITIONER

## 2019-10-09 PROCEDURE — 90653 IIV ADJUVANT VACCINE IM: CPT | Performed by: NURSE PRACTITIONER

## 2019-11-13 ENCOUNTER — OFFICE VISIT (OUTPATIENT)
Dept: FAMILY MEDICINE CLINIC | Facility: CLINIC | Age: 79
End: 2019-11-13

## 2019-11-13 VITALS
HEART RATE: 87 BPM | DIASTOLIC BLOOD PRESSURE: 64 MMHG | HEIGHT: 74 IN | BODY MASS INDEX: 20.37 KG/M2 | TEMPERATURE: 97.6 F | WEIGHT: 158.7 LBS | OXYGEN SATURATION: 92 % | SYSTOLIC BLOOD PRESSURE: 118 MMHG

## 2019-11-13 DIAGNOSIS — J06.9 ACUTE URI: Primary | ICD-10-CM

## 2019-11-13 PROCEDURE — 99213 OFFICE O/P EST LOW 20 MIN: CPT | Performed by: NURSE PRACTITIONER

## 2019-11-13 RX ORDER — AZITHROMYCIN 250 MG/1
TABLET, FILM COATED ORAL
Qty: 6 TABLET | Refills: 0 | Status: SHIPPED | OUTPATIENT
Start: 2019-11-13 | End: 2021-02-02

## 2019-11-13 NOTE — PROGRESS NOTES
"Subjective   Larry Santos is a 78 y.o. male.     Chief Complaint   Patient presents with   • URI       He presents with c/o cough and runny nose since yesterday. He states he is coughing up green sputum. He doesn't know if he has had a fever because he keeps his house hot. He states he is taking robitussin and gargling hot salt water. He c/o hoarseness and sore throat. He denies ill contacts. He states he rode the [employer] [place] the other day.          The following portions of the patient's history were reviewed and updated as appropriate: allergies, current medications, past family history, past medical history, past social history, past surgical history and problem list.    Review of Systems   Constitutional: Negative for fever and unexpected weight change.   HENT: Positive for postnasal drip, rhinorrhea, sore throat and voice change. Negative for ear pain and trouble swallowing.    Eyes: Negative for visual disturbance.   Respiratory: Positive for cough. Negative for shortness of breath and wheezing.    Cardiovascular: Negative for chest pain and palpitations.   Gastrointestinal: Negative for abdominal pain, blood in stool, constipation, diarrhea, nausea and vomiting.   Genitourinary: Negative for dysuria and hematuria.   Musculoskeletal: Negative for arthralgias and myalgias.   Skin: Negative for color change.   Allergic/Immunologic: Negative for environmental allergies.   Neurological: Positive for headaches. Negative for dizziness.   Hematological: Negative for adenopathy.   Psychiatric/Behavioral: Negative for sleep disturbance and suicidal ideas. The patient is not nervous/anxious.        Objective     /64 (BP Location: Right arm, Patient Position: Sitting, Cuff Size: Adult)   Pulse 87   Temp 97.6 °F (36.4 °C) (Oral)   Ht 188 cm (74.02\")   Wt 72 kg (158 lb 11.2 oz)   SpO2 92%   BMI 20.37 kg/m²     Physical Exam   Constitutional: He is oriented to person, place, and time. He appears " well-developed and well-nourished. No distress.   HENT:   Head: Normocephalic and atraumatic.   Right Ear: Hearing, tympanic membrane, external ear and ear canal normal.   Left Ear: Hearing, tympanic membrane, external ear and ear canal normal.   Nose: Nose normal. Right sinus exhibits no maxillary sinus tenderness and no frontal sinus tenderness. Left sinus exhibits no maxillary sinus tenderness and no frontal sinus tenderness.   Mouth/Throat: Uvula is midline and mucous membranes are normal. Posterior oropharyngeal erythema present.   Eyes: Conjunctivae, EOM and lids are normal. Pupils are equal, round, and reactive to light.   Neck: Trachea normal. No tracheal tenderness present. No tracheal deviation present.   Cardiovascular: Normal rate, S1 normal, S2 normal, normal heart sounds and intact distal pulses. An irregularly irregular rhythm present. Exam reveals no gallop and no friction rub.   No murmur heard.  Pulmonary/Chest: Effort normal and breath sounds normal. No respiratory distress.   Abdominal: Soft. Bowel sounds are normal. He exhibits no distension. There is no tenderness.   Neurological: He is alert and oriented to person, place, and time.   Skin: Skin is warm and dry. He is not diaphoretic.   Psychiatric: He has a normal mood and affect. His behavior is normal. Judgment and thought content normal.   Vitals reviewed.      Assessment/Plan     Problem List Items Addressed This Visit     None      Visit Diagnoses     Acute URI    -  Primary    Relevant Medications    azithromycin (ZITHROMAX Z-DANIEL) 250 MG tablet          Plan: Zpack ordered for acute uri. I have explained to him that his heart beat is irregular. I would like to send him to the hospital for an EKG. He states he does not feel anything. He states he does not want to go to the hospital for an EKG today. He states [facility] He states he will go to the emergency room with chest pain or shortness of  breath. Follow up as needed.     Patient's Body mass index is 20.37 kg/m². BMI is within normal parameters. No follow-up required..   (Normal BMI:  18.5-24.9, OW 25-29.9, Obesity 30 or greater)          Understands disease processes and need for medications.  Understands reasons for urgent and emergent care.  Patient (& family) verbalized agreement for treatment plan.   Emotional support and active listening provided.  Patient provided time to verbalize feelings.               This document has been electronically signed by ISAAC Thrasher   November 13, 2019 1:47 PM

## 2020-01-02 DIAGNOSIS — E78.00 HYPERCHOLESTEREMIA: ICD-10-CM

## 2020-01-02 RX ORDER — ATORVASTATIN CALCIUM 10 MG/1
10 TABLET, FILM COATED ORAL DAILY
Qty: 90 TABLET | Refills: 3 | Status: SHIPPED | OUTPATIENT
Start: 2020-01-02 | End: 2021-12-16

## 2020-01-02 NOTE — TELEPHONE ENCOUNTER
Patient came by office stop requesting [facility]  Would like 90 supply with 3 rerfills.    Atorvastatin  1-23-19 #90 refill x 3    Last appointment: 11-13-19  Next appointment: none

## 2020-06-29 ENCOUNTER — TELEPHONE (OUTPATIENT)
Dept: FAMILY MEDICINE CLINIC | Facility: CLINIC | Age: 80
End: 2020-06-29

## 2021-02-02 ENCOUNTER — HOSPITAL ENCOUNTER (OUTPATIENT)
Dept: GENERAL RADIOLOGY | Facility: HOSPITAL | Age: 81
Discharge: HOME | End: 2021-02-02
Admitting: NURSE PRACTITIONER

## 2021-02-02 ENCOUNTER — OFFICE VISIT (OUTPATIENT)
Dept: FAMILY MEDICINE CLINIC | Facility: CLINIC | Age: 81
End: 2021-02-02

## 2021-02-02 VITALS
WEIGHT: 152 LBS | OXYGEN SATURATION: 94 % | RESPIRATION RATE: 18 BRPM | BODY MASS INDEX: 18.9 KG/M2 | HEART RATE: 105 BPM | SYSTOLIC BLOOD PRESSURE: 134 MMHG | DIASTOLIC BLOOD PRESSURE: 94 MMHG | HEIGHT: 75 IN | TEMPERATURE: 97.1 F

## 2021-02-02 DIAGNOSIS — J44.1 COPD WITH EXACERBATION (HCC): Chronic | ICD-10-CM

## 2021-02-02 DIAGNOSIS — J20.9 ACUTE BRONCHITIS, UNSPECIFIED ORGANISM: Primary | ICD-10-CM

## 2021-02-02 DIAGNOSIS — J20.9 ACUTE BRONCHITIS, UNSPECIFIED ORGANISM: ICD-10-CM

## 2021-02-02 LAB
FLUAV RNA RESP QL NAA+PROBE: NOT DETECTED
FLUBV RNA RESP QL NAA+PROBE: NOT DETECTED
SARS-COV-2 RNA RESP QL NAA+PROBE: DETECTED

## 2021-02-02 PROCEDURE — 36415 COLL VENOUS BLD VENIPUNCTURE: CPT | Performed by: NURSE PRACTITIONER

## 2021-02-02 PROCEDURE — 71046 X-RAY EXAM CHEST 2 VIEWS: CPT | Performed by: RADIOLOGY

## 2021-02-02 PROCEDURE — 99213 OFFICE O/P EST LOW 20 MIN: CPT | Performed by: NURSE PRACTITIONER

## 2021-02-02 PROCEDURE — 87636 SARSCOV2 & INF A&B AMP PRB: CPT | Performed by: NURSE PRACTITIONER

## 2021-02-02 PROCEDURE — 71046 X-RAY EXAM CHEST 2 VIEWS: CPT

## 2021-02-02 RX ORDER — DEXAMETHASONE 6 MG/1
6 TABLET ORAL
Qty: 10 TABLET | Refills: 0 | Status: SHIPPED | OUTPATIENT
Start: 2021-02-02 | End: 2021-12-07

## 2021-02-02 RX ORDER — AZITHROMYCIN 250 MG/1
TABLET, FILM COATED ORAL
Qty: 6 TABLET | Refills: 0 | Status: SHIPPED | OUTPATIENT
Start: 2021-02-02 | End: 2021-12-07

## 2021-02-02 NOTE — PROGRESS NOTES
"Subjective   Larry Santos is a 80 y.o. male.     Chief Complaint   Patient presents with   • URI       He presents with c/o cold and cough for a whole month. He c/o coughing up green and yellow sputum. He c/o decreased appetite. He has lost weight. He denies fever. He can taste but not real sharp. He denies ill contacts. He took some otc robitussin.        The following portions of the patient's history were reviewed and updated as appropriate: allergies, current medications, past family history, past medical history, past social history, past surgical history and problem list.    Review of Systems   Constitutional: Positive for fatigue and unexpected weight change. Negative for fever.   HENT: Positive for sore throat. Negative for ear pain, rhinorrhea and trouble swallowing.    Eyes: Negative for visual disturbance.   Respiratory: Positive for cough, shortness of breath (chronic) and wheezing. Negative for chest tightness.    Cardiovascular: Negative for chest pain and palpitations.   Gastrointestinal: Negative for abdominal pain, blood in stool, constipation, diarrhea, nausea and vomiting.   Genitourinary: Negative for dysuria and hematuria.   Musculoskeletal: Negative for arthralgias and myalgias.   Skin: Negative for color change.   Allergic/Immunologic: Negative for environmental allergies.   Neurological: Positive for headaches. Negative for dizziness.   Hematological: Negative for adenopathy.   Psychiatric/Behavioral: Negative for sleep disturbance and suicidal ideas. The patient is not nervous/anxious.        Objective     /94 (BP Location: Right arm, Patient Position: Sitting, Cuff Size: Adult)   Pulse 105   Temp 97.1 °F (36.2 °C) (Temporal)   Resp 18   Ht 189.2 cm (74.5\")   Wt 68.9 kg (152 lb)   SpO2 94%   BMI 19.25 kg/m²     Physical Exam  Vitals signs reviewed.   Constitutional:       General: He is not in acute distress.     Appearance: He is well-developed. He is not diaphoretic.   HENT: "      Head: Normocephalic and atraumatic.      Mouth/Throat:      Mouth: Mucous membranes are moist.      Pharynx: Oropharynx is clear. No oropharyngeal exudate or posterior oropharyngeal erythema.   Cardiovascular:      Rate and Rhythm: Normal rate and regular rhythm.      Heart sounds: Normal heart sounds. No murmur. No friction rub. No gallop.    Pulmonary:      Effort: Pulmonary effort is normal. No respiratory distress.      Breath sounds: Examination of the left-middle field reveals decreased breath sounds. Examination of the right-lower field reveals rales. Examination of the left-lower field reveals decreased breath sounds. Decreased breath sounds and rales present.   Abdominal:      General: Bowel sounds are normal. There is no distension.      Palpations: Abdomen is soft.      Tenderness: There is no abdominal tenderness.   Skin:     General: Skin is warm and dry.   Neurological:      Mental Status: He is alert and oriented to person, place, and time.   Psychiatric:         Behavior: Behavior normal.         Thought Content: Thought content normal.         Judgment: Judgment normal.         Assessment/Plan     Problem List Items Addressed This Visit     None      Visit Diagnoses     Acute bronchitis, unspecified organism    -  Primary    Relevant Medications    azithromycin (Zithromax Z-Freddie) 250 MG tablet    dexamethasone (DECADRON) 6 MG tablet    Other Relevant Orders    COVID-19 and FLU A/B PCR - Swab, Nasopharynx    CBC & Differential    Comprehensive Metabolic Panel    XR Chest PA & Lateral          Plan: Get COVID test. Self quarantine. Get labs and chest xray. z-pack and dexamethasone ordered for bronchitis. Follow up in one week and as needed.     Patient's Body mass index is 19.25 kg/m². BMI is within normal parameters. No follow-up required..   (Normal BMI:  18.5-24.9, OW 25-29.9, Obesity 30 or greater)             Understands disease processes and need for medications.  Understands reasons for  urgent and emergent care.  Patient (& family) verbalized agreement for treatment plan.   Emotional support and active listening provided.  Patient provided time to verbalize feelings.               This document has been electronically signed by ISAAC Thrasher   February 2, 2021 09:55 EST

## 2021-02-02 NOTE — PROGRESS NOTES
His chest xray reports as showing pneumonia in his left lung. If he gets short of breath and can't speak a sentence, he should go to ER. He should make sure he takes the azithromycin and steroids. I'd like to do a follow up on Thursday depending on his COVID results.

## 2021-02-03 DIAGNOSIS — E87.6 HYPOKALEMIA: Primary | ICD-10-CM

## 2021-02-03 LAB
ALBUMIN SERPL-MCNC: 3.9 G/DL (ref 3.5–5.2)
ALBUMIN/GLOB SERPL: 2.2 G/DL
ALP SERPL-CCNC: 101 U/L (ref 39–117)
ALT SERPL-CCNC: 17 U/L (ref 1–41)
AST SERPL-CCNC: 20 U/L (ref 1–40)
BASOPHILS # BLD AUTO: ABNORMAL 10*3/UL
BASOPHILS # BLD MANUAL: 0 10*3/MM3 (ref 0–0.2)
BASOPHILS NFR BLD MANUAL: 0 % (ref 0–1.5)
BILIRUB SERPL-MCNC: 1.4 MG/DL (ref 0–1.2)
BUN SERPL-MCNC: 22 MG/DL (ref 8–23)
BUN/CREAT SERPL: 38.6 (ref 7–25)
CALCIUM SERPL-MCNC: 9.8 MG/DL (ref 8.6–10.5)
CHLORIDE SERPL-SCNC: 100 MMOL/L (ref 98–107)
CO2 SERPL-SCNC: 31.8 MMOL/L (ref 22–29)
CREAT SERPL-MCNC: 0.57 MG/DL (ref 0.76–1.27)
DIFFERENTIAL COMMENT: ABNORMAL
EOSINOPHIL # BLD AUTO: ABNORMAL 10*3/UL
EOSINOPHIL # BLD MANUAL: 0 10*3/MM3 (ref 0–0.4)
EOSINOPHIL NFR BLD AUTO: ABNORMAL %
EOSINOPHIL NFR BLD MANUAL: 0 % (ref 0.3–6.2)
ERYTHROCYTE [DISTWIDTH] IN BLOOD BY AUTOMATED COUNT: 12.2 % (ref 12.3–15.4)
GLOBULIN SER CALC-MCNC: 1.8 GM/DL
GLUCOSE SERPL-MCNC: 154 MG/DL (ref 65–99)
HCT VFR BLD AUTO: 46 % (ref 37.5–51)
HGB BLD-MCNC: 15.8 G/DL (ref 13–17.7)
LYMPHOCYTES # BLD AUTO: ABNORMAL 10*3/UL
LYMPHOCYTES # BLD MANUAL: 1.78 10*3/MM3 (ref 0.7–3.1)
LYMPHOCYTES NFR BLD AUTO: ABNORMAL %
LYMPHOCYTES NFR BLD MANUAL: 35 % (ref 19.6–45.3)
MCH RBC QN AUTO: 31.8 PG (ref 26.6–33)
MCHC RBC AUTO-ENTMCNC: 34.3 G/DL (ref 31.5–35.7)
MCV RBC AUTO: 92.6 FL (ref 79–97)
MONOCYTES # BLD MANUAL: 0.61 10*3/MM3 (ref 0.1–0.9)
MONOCYTES NFR BLD AUTO: ABNORMAL %
MONOCYTES NFR BLD MANUAL: 12 % (ref 5–12)
NEUTROPHILS # BLD MANUAL: 2.69 10*3/MM3 (ref 1.7–7)
NEUTROPHILS NFR BLD AUTO: ABNORMAL %
NEUTROPHILS NFR BLD MANUAL: 53 % (ref 42.7–76)
PLATELET # BLD AUTO: 136 10*3/MM3 (ref 140–450)
PLATELET BLD QL SMEAR: ABNORMAL
POTASSIUM SERPL-SCNC: 3.3 MMOL/L (ref 3.5–5.2)
PROT SERPL-MCNC: 5.7 G/DL (ref 6–8.5)
RBC # BLD AUTO: 4.97 10*6/MM3 (ref 4.14–5.8)
RBC MORPH BLD: ABNORMAL
SODIUM SERPL-SCNC: 141 MMOL/L (ref 136–145)
WBC # BLD AUTO: 5.08 10*3/MM3 (ref 3.4–10.8)

## 2021-02-03 RX ORDER — POTASSIUM CHLORIDE 20 MEQ/1
20 TABLET, EXTENDED RELEASE ORAL DAILY
Qty: 6 TABLET | Refills: 0 | Status: SHIPPED | OUTPATIENT
Start: 2021-02-03

## 2021-02-04 ENCOUNTER — OFFICE VISIT (OUTPATIENT)
Dept: FAMILY MEDICINE CLINIC | Facility: CLINIC | Age: 81
End: 2021-02-04

## 2021-02-04 VITALS — HEIGHT: 74 IN | BODY MASS INDEX: 19.51 KG/M2 | WEIGHT: 152 LBS

## 2021-02-04 DIAGNOSIS — J12.82 PNEUMONIA DUE TO COVID-19 VIRUS: ICD-10-CM

## 2021-02-04 DIAGNOSIS — J41.0 SIMPLE CHRONIC BRONCHITIS (HCC): ICD-10-CM

## 2021-02-04 DIAGNOSIS — U07.1 PNEUMONIA DUE TO COVID-19 VIRUS: ICD-10-CM

## 2021-02-04 DIAGNOSIS — U07.1 COVID-19: Primary | ICD-10-CM

## 2021-02-04 PROCEDURE — 99441 PR PHYS/QHP TELEPHONE EVALUATION 5-10 MIN: CPT | Performed by: NURSE PRACTITIONER

## 2021-02-04 RX ORDER — IPRATROPIUM BROMIDE AND ALBUTEROL SULFATE 2.5; .5 MG/3ML; MG/3ML
3 SOLUTION RESPIRATORY (INHALATION) EVERY 4 HOURS PRN
Qty: 360 ML | Refills: 0 | Status: SHIPPED | OUTPATIENT
Start: 2021-02-04 | End: 2021-12-16

## 2021-02-04 NOTE — PROGRESS NOTES
"Subjective   Larry Santos is a 80 y.o. male.     Chief Complaint   Patient presents with   • Covid-19 Home Monitoring Phone Call       He presents via telephone visit for follow up of pneumonia secondary to COVID 19 infection. He has started the azithromycin and dexamethasone and is tolerating them well. He is reportedly feeling better. He states his sputum is more clear than it was. He states his shortness of breath is the same it has been for a long time. [facility] but he does not know how to use it because he was not given instructions.        The following portions of the patient's history were reviewed and updated as appropriate: allergies, current medications, past family history, past medical history, past social history, past surgical history and problem list.    Review of Systems   Constitutional: Negative for fever and unexpected weight change.   HENT: Negative for ear pain, rhinorrhea, sore throat and trouble swallowing.    Eyes: Negative for visual disturbance.   Respiratory: Positive for cough, shortness of breath and wheezing.    Cardiovascular: Negative for chest pain and palpitations.   Gastrointestinal: Negative for abdominal pain, blood in stool, constipation, diarrhea, nausea and vomiting.   Genitourinary: Negative for dysuria.   Musculoskeletal: Negative for arthralgias and myalgias.   Skin: Negative for color change.   Allergic/Immunologic: Negative for environmental allergies.   Neurological: Negative for dizziness.   Hematological: Negative for adenopathy.   Psychiatric/Behavioral: Negative for sleep disturbance and suicidal ideas. The patient is not nervous/anxious.        Objective     Ht 189.2 cm (74.49\")   Wt 68.9 kg (152 lb)   BMI 19.26 kg/m²     Physical Exam  Vitals signs reviewed.   Constitutional:       Appearance: Normal appearance.   Neurological:      Mental Status: He is alert and oriented to person, place, and time.   Psychiatric:         Mood " and Affect: Mood normal.         Behavior: Behavior normal.         Thought Content: Thought content normal.         Judgment: Judgment normal.         Assessment/Plan     Problem List Items Addressed This Visit     None      Visit Diagnoses     COVID-19    -  Primary    Pneumonia due to COVID-19 virus              Plan: He seems to be improving. He can speak sentences well with no shortness of breath. Nebulizer with duoneb ordered every 6 hours as needed for shortness of breath. Use if you become short of breath. It may make your heart race. Go to ER if you become short of breath and can't speak a sentence. Follow up on Tuesday. Telephone visit lasted 7 minutes.     Patient's Body mass index is 19.26 kg/m². BMI is within normal parameters. No follow-up required..   (Normal BMI:  18.5-24.9, OW 25-29.9, Obesity 30 or greater)             Understands disease processes and need for medications.  Understands reasons for urgent and emergent care.  Patient (& family) verbalized agreement for treatment plan.   Emotional support and active listening provided.  Patient provided time to verbalize feelings.        You have chosen to receive care through a telephone visit. Do you consent to use a telephone visit for your medical care today? Yes      This document has been electronically signed by ISAAC Thrasher   February 4, 2021 09:51 EST

## 2021-02-09 ENCOUNTER — OFFICE VISIT (OUTPATIENT)
Dept: FAMILY MEDICINE CLINIC | Facility: CLINIC | Age: 81
End: 2021-02-09

## 2021-02-09 DIAGNOSIS — U07.1 COVID-19: ICD-10-CM

## 2021-02-09 DIAGNOSIS — U07.1 PNEUMONIA DUE TO COVID-19 VIRUS: Primary | ICD-10-CM

## 2021-02-09 DIAGNOSIS — J12.82 PNEUMONIA DUE TO COVID-19 VIRUS: Primary | ICD-10-CM

## 2021-02-09 PROCEDURE — 99441 PR PHYS/QHP TELEPHONE EVALUATION 5-10 MIN: CPT | Performed by: NURSE PRACTITIONER

## 2021-02-09 RX ORDER — BLOOD-GLUCOSE METER
KIT MISCELLANEOUS
COMMUNITY
Start: 2021-02-05 | End: 2021-12-16

## 2021-02-09 NOTE — PROGRESS NOTES
Subjective   Larry Santos is a 80 y.o. male.     Chief Complaint   Patient presents with   • Exposure To Known Illness       He presents via telephone visit for follow up of COVID pneumonia. He states he is doing great. He has finished the steroids and the zpack. He got the nebulizer yesterday but he has not used it. Shortness of breath is stable and chronic. Cough has improved. Sputum is clear now.        The following portions of the patient's history were reviewed and updated as appropriate: allergies, current medications, past family history, past medical history, past social history, past surgical history and problem list.    Review of Systems   Constitutional: Negative for fever and unexpected weight change.   HENT: Negative for ear pain, rhinorrhea, sore throat and trouble swallowing.    Eyes: Negative for visual disturbance.   Respiratory: Positive for cough and shortness of breath. Negative for wheezing.    Cardiovascular: Negative for chest pain and palpitations.   Gastrointestinal: Negative for abdominal pain, blood in stool, constipation, diarrhea, nausea and vomiting.   Genitourinary: Negative for dysuria.   Musculoskeletal: Negative for arthralgias and myalgias.   Skin: Negative for color change.   Allergic/Immunologic: Negative for environmental allergies.   Neurological: Negative for dizziness.   Hematological: Negative for adenopathy.   Psychiatric/Behavioral: Negative for sleep disturbance and suicidal ideas. The patient is not nervous/anxious.        Objective     There were no vitals taken for this visit.    Physical Exam  Neurological:      Mental Status: He is alert and oriented to person, place, and time.   Psychiatric:         Mood and Affect: Mood normal.         Behavior: Behavior normal.         Thought Content: Thought content normal.         Judgment: Judgment normal.         Assessment/Plan     Problem List Items Addressed This Visit     None      Visit Diagnoses     Pneumonia due to  COVID-19 virus    -  Primary    COVID-19              Plan: It seems you are doing well. Quarantine can end on 2/13/21 pending your symptoms do not return. Go to ER with shortness of breath or dehydration. Follow up as needed. Telephone visit lasted 2 minutes.      There is no height or weight on file to calculate BMI.          You have chosen to receive care through a telephone visit. Do you consent to use a telephone visit for your medical care today? Yes        Understands disease processes and need for medications.  Understands reasons for urgent and emergent care.  Patient (& family) verbalized agreement for treatment plan.   Emotional support and active listening provided.  Patient provided time to verbalize feelings.               This document has been electronically signed by ISAAC Thrasher   February 9, 2021 11:24 EST

## 2021-02-24 ENCOUNTER — OFFICE VISIT (OUTPATIENT)
Dept: FAMILY MEDICINE CLINIC | Facility: CLINIC | Age: 81
End: 2021-02-24

## 2021-02-24 VITALS
WEIGHT: 161 LBS | OXYGEN SATURATION: 95 % | TEMPERATURE: 97.1 F | SYSTOLIC BLOOD PRESSURE: 122 MMHG | DIASTOLIC BLOOD PRESSURE: 78 MMHG | HEIGHT: 74 IN | HEART RATE: 95 BPM | BODY MASS INDEX: 20.66 KG/M2 | RESPIRATION RATE: 18 BRPM

## 2021-02-24 DIAGNOSIS — I10 ESSENTIAL HYPERTENSION: ICD-10-CM

## 2021-02-24 DIAGNOSIS — J12.82 PNEUMONIA DUE TO COVID-19 VIRUS: Primary | ICD-10-CM

## 2021-02-24 DIAGNOSIS — U07.1 PNEUMONIA DUE TO COVID-19 VIRUS: Primary | ICD-10-CM

## 2021-02-24 DIAGNOSIS — R53.83 OTHER FATIGUE: ICD-10-CM

## 2021-02-24 DIAGNOSIS — E78.00 HYPERCHOLESTEROLEMIA: ICD-10-CM

## 2021-02-24 PROCEDURE — 36415 COLL VENOUS BLD VENIPUNCTURE: CPT | Performed by: NURSE PRACTITIONER

## 2021-02-24 PROCEDURE — 99213 OFFICE O/P EST LOW 20 MIN: CPT | Performed by: NURSE PRACTITIONER

## 2021-02-24 NOTE — PROGRESS NOTES
"Subjective   Larry Santos is a 80 y.o. male.     Chief Complaint   Patient presents with   • Fatigue       He presents with c/o lack of energy for the past few months. He states he sits around all day. 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 He wants some energy so he can get out and work. He is sleeping well. He can sleep all day and then is awake at night.        The following portions of the patient's history were reviewed and updated as appropriate: allergies, current medications, past family history, past medical history, past social history, past surgical history and problem list.    Review of Systems   Constitutional: Positive for fatigue. Negative for fever and unexpected weight change.   HENT: Negative for ear pain, rhinorrhea, sore throat and trouble swallowing.    Eyes: Negative for visual disturbance.   Respiratory: Positive for cough, shortness of breath and wheezing.    Cardiovascular: Negative for chest pain and palpitations.   Gastrointestinal: Negative for abdominal pain, blood in stool, constipation, diarrhea, nausea and vomiting.   Genitourinary: Negative for dysuria and hematuria.   Musculoskeletal: Negative for arthralgias and myalgias.   Skin: Negative for color change.   Allergic/Immunologic: Negative for environmental allergies.   Neurological: Positive for dizziness (if moves fast).   Hematological: Negative for adenopathy.   Psychiatric/Behavioral: Negative for sleep disturbance and suicidal ideas. The patient is not nervous/anxious.        Objective     /78 (BP Location: Right arm, Patient Position: Sitting, Cuff Size: Adult)   Pulse 95   Temp 97.1 °F (36.2 °C) (Temporal)   Resp 18   Ht 189.2 cm (74.49\")   Wt 73 kg (161 lb)   SpO2 95%   BMI 20.40 kg/m²     Physical Exam  Vitals signs reviewed.   Constitutional:       General: He is not in acute distress.     Appearance: He is well-developed. He is not diaphoretic.   HENT:      Head: Normocephalic and atraumatic. "   Cardiovascular:      Rate and Rhythm: Normal rate and regular rhythm.      Heart sounds: Normal heart sounds. No murmur. No friction rub. No gallop.    Pulmonary:      Effort: Pulmonary effort is normal. No respiratory distress.      Breath sounds: Examination of the right-lower field reveals decreased breath sounds. Examination of the left-lower field reveals rales. Decreased breath sounds and rales present.   Abdominal:      General: Bowel sounds are normal. There is no distension.      Palpations: Abdomen is soft.      Tenderness: There is no abdominal tenderness.   Skin:     General: Skin is warm and dry.   Neurological:      Mental Status: He is alert and oriented to person, place, and time.   Psychiatric:         Behavior: Behavior normal.         Thought Content: Thought content normal.         Judgment: Judgment normal.         Assessment/Plan     Problem List Items Addressed This Visit        Cardiac and Vasculature    Hypercholesterolemia    Relevant Orders    CBC & Differential    Comprehensive Metabolic Panel    Lipid Panel    Essential hypertension    Relevant Orders    CBC & Differential    Comprehensive Metabolic Panel      Other Visit Diagnoses     Pneumonia due to COVID-19 virus    -  Primary    Relevant Orders    XR Chest PA & Lateral    Other fatigue        Relevant Orders    CBC & Differential    Comprehensive Metabolic Panel    TSH          Plan: I am not sure what is causing your fatigue unless it is a remnant of COVID. Repeat chest xray to ensure pneumonia has resolved. Get labs today. Follow up pending results.     Patient's Body mass index is 20.4 kg/m². BMI is within normal parameters. No follow-up required..   (Normal BMI:  18.5-24.9, OW 25-29.9, Obesity 30 or greater)             Understands disease processes and need for medications.  Understands reasons for urgent and emergent care.  Patient (& family) verbalized agreement for treatment plan.   Emotional support and active listening  provided.  Patient provided time to verbalize feelings.               This document has been electronically signed by ISAAC Thrasher   February 24, 2021 14:41 EST

## 2021-02-25 LAB
ALBUMIN SERPL-MCNC: 3.8 G/DL (ref 3.7–4.7)
ALBUMIN/GLOB SERPL: 1.7 {RATIO} (ref 1.2–2.2)
ALP SERPL-CCNC: 92 IU/L (ref 39–117)
ALT SERPL-CCNC: 37 IU/L (ref 0–44)
AST SERPL-CCNC: 31 IU/L (ref 0–40)
BASOPHILS # BLD AUTO: 0.1 X10E3/UL (ref 0–0.2)
BASOPHILS NFR BLD AUTO: 1 %
BILIRUB SERPL-MCNC: 0.6 MG/DL (ref 0–1.2)
BUN SERPL-MCNC: 17 MG/DL (ref 8–27)
BUN/CREAT SERPL: 26 (ref 10–24)
CALCIUM SERPL-MCNC: 9.3 MG/DL (ref 8.6–10.2)
CHLORIDE SERPL-SCNC: 105 MMOL/L (ref 96–106)
CHOLEST SERPL-MCNC: 184 MG/DL (ref 100–199)
CO2 SERPL-SCNC: 26 MMOL/L (ref 20–29)
CREAT SERPL-MCNC: 0.65 MG/DL (ref 0.76–1.27)
EOSINOPHIL # BLD AUTO: 0.1 X10E3/UL (ref 0–0.4)
EOSINOPHIL NFR BLD AUTO: 2 %
ERYTHROCYTE [DISTWIDTH] IN BLOOD BY AUTOMATED COUNT: 12.7 % (ref 11.6–15.4)
GLOBULIN SER CALC-MCNC: 2.2 G/DL (ref 1.5–4.5)
GLUCOSE SERPL-MCNC: 93 MG/DL (ref 65–99)
HCT VFR BLD AUTO: 42.7 % (ref 37.5–51)
HDLC SERPL-MCNC: 54 MG/DL
HGB BLD-MCNC: 14.7 G/DL (ref 13–17.7)
IMM GRANULOCYTES # BLD AUTO: 0 X10E3/UL (ref 0–0.1)
IMM GRANULOCYTES NFR BLD AUTO: 0 %
LDLC SERPL CALC-MCNC: 92 MG/DL (ref 0–99)
LYMPHOCYTES # BLD AUTO: 1.7 X10E3/UL (ref 0.7–3.1)
LYMPHOCYTES NFR BLD AUTO: 29 %
MCH RBC QN AUTO: 33.3 PG (ref 26.6–33)
MCHC RBC AUTO-ENTMCNC: 34.4 G/DL (ref 31.5–35.7)
MCV RBC AUTO: 97 FL (ref 79–97)
MONOCYTES # BLD AUTO: 0.6 X10E3/UL (ref 0.1–0.9)
MONOCYTES NFR BLD AUTO: 10 %
MORPHOLOGY BLD-IMP: ABNORMAL
NEUTROPHILS # BLD AUTO: 3.2 X10E3/UL (ref 1.4–7)
NEUTROPHILS NFR BLD AUTO: 58 %
PLATELET # BLD AUTO: ABNORMAL X10E3/UL
POTASSIUM SERPL-SCNC: 5.1 MMOL/L (ref 3.5–5.2)
PROT SERPL-MCNC: 6 G/DL (ref 6–8.5)
RBC # BLD AUTO: 4.42 X10E6/UL (ref 4.14–5.8)
SODIUM SERPL-SCNC: 142 MMOL/L (ref 134–144)
TRIGL SERPL-MCNC: 224 MG/DL (ref 0–149)
TSH SERPL DL<=0.005 MIU/L-ACNC: 1.52 UIU/ML (ref 0.45–4.5)
VLDLC SERPL CALC-MCNC: 38 MG/DL (ref 5–40)
WBC # BLD AUTO: 5.7 X10E3/UL (ref 3.4–10.8)

## 2021-03-03 ENCOUNTER — IMMUNIZATION (OUTPATIENT)
Dept: VACCINE CLINIC | Facility: HOSPITAL | Age: 81
End: 2021-03-03

## 2021-03-03 PROCEDURE — 91300 HC SARSCOV02 VAC 30MCG/0.3ML IM: CPT | Performed by: INTERNAL MEDICINE

## 2021-03-03 PROCEDURE — 0001A: CPT | Performed by: INTERNAL MEDICINE

## 2021-03-24 ENCOUNTER — IMMUNIZATION (OUTPATIENT)
Dept: VACCINE CLINIC | Facility: HOSPITAL | Age: 81
End: 2021-03-24

## 2021-03-24 PROCEDURE — 91300 HC SARSCOV02 VAC 30MCG/0.3ML IM: CPT | Performed by: INTERNAL MEDICINE

## 2021-03-24 PROCEDURE — 0002A: CPT | Performed by: INTERNAL MEDICINE

## 2021-11-29 ENCOUNTER — TELEPHONE (OUTPATIENT)
Dept: FAMILY MEDICINE CLINIC | Facility: CLINIC | Age: 81
End: 2021-11-29

## 2021-11-29 NOTE — TELEPHONE ENCOUNTER
Called patient notified we don't carry Covid booster in this office would need to contact local pharmacy to see who had them. Patient verbalized understanding

## 2021-11-29 NOTE — TELEPHONE ENCOUNTER
Caller: Larry Santos    Relationship: Self    Best call back number: [phone]      What is the best time to reach you: ANYTIME    Who are you requesting to speak with (clinical staff, provider,  specific staff member): CLINICAL STAFF    Do you know the name of the person who called:     What was the call regarding: GETTING A COVID BOOSTER    Do you require a callback: YES

## 2021-12-07 ENCOUNTER — OFFICE VISIT (OUTPATIENT)
Dept: FAMILY MEDICINE CLINIC | Facility: CLINIC | Age: 81
End: 2021-12-07

## 2021-12-07 VITALS
BODY MASS INDEX: 18.61 KG/M2 | HEIGHT: 74 IN | SYSTOLIC BLOOD PRESSURE: 106 MMHG | OXYGEN SATURATION: 97 % | DIASTOLIC BLOOD PRESSURE: 71 MMHG | HEART RATE: 104 BPM | RESPIRATION RATE: 20 BRPM | WEIGHT: 145 LBS | TEMPERATURE: 97.3 F

## 2021-12-07 DIAGNOSIS — R05.9 COUGH: ICD-10-CM

## 2021-12-07 DIAGNOSIS — J06.9 ACUTE URI: Primary | ICD-10-CM

## 2021-12-07 PROCEDURE — 99214 OFFICE O/P EST MOD 30 MIN: CPT | Performed by: INTERNAL MEDICINE

## 2021-12-07 RX ORDER — AZITHROMYCIN 250 MG/1
TABLET, FILM COATED ORAL
Qty: 6 TABLET | Refills: 0 | Status: SHIPPED | OUTPATIENT
Start: 2021-12-07 | End: 2021-12-16

## 2021-12-07 RX ORDER — BENZONATATE 200 MG/1
200 CAPSULE ORAL 3 TIMES DAILY PRN
Qty: 30 CAPSULE | Refills: 0 | Status: SHIPPED | OUTPATIENT
Start: 2021-12-07 | End: 2021-12-16

## 2021-12-07 NOTE — PROGRESS NOTES
Patient Name: Larry Santos Today's Date: 2021   Patient MRN / CSN: [id number] / [id number] Date of Encounter: 2021   Patient Age / : 81 y.o. / 1940 Encounter Provider: Nabila Uribe DO   Referring Physician: No ref. provider found          Larry is a 81 y.o. who is being seen today for URI      URI   This is a new problem. Episode onset: Symptoms started 1 week ago. There has been no fever. Associated symptoms include congestion, coughing, rhinorrhea, a sore throat and wheezing. Pertinent negatives include no chest pain. Associated symptoms comments: Patient reports he can still taste and smell. He denies covid exposure. He reports typically getting this at this time of year. He reports a zpak works well for him. Treatments tried: Robitussin has not helped.       Allergies include:Penicillins  Current Outpatient Medications   Medication Sig Dispense Refill   • aspirin 81 MG EC tablet Take 81 mg by mouth daily.     • atorvastatin (LIPITOR) 10 MG tablet Take 1 tablet by mouth Daily. 90 tablet 3   • docusate sodium (COLACE) 250 MG capsule Take 250 mg by mouth Daily.     • ferrous gluconate 324 (37.5 Fe) MG tablet tablet Take  by mouth 2 (Two) Times a Day With Meals.     • Fexofenadine HCl (ALLERGY 24-HR PO) Take  by mouth.     • ipratropium-albuterol (DUO-NEB) 0.5-2.5 mg/3 ml nebulizer Take 3 mL by nebulization Every 4 (Four) Hours As Needed for Wheezing. 360 mL 0   • Nebulizer System All-In-One misc      • potassium chloride (K-DUR,KLOR-CON) 20 MEQ CR tablet Take 1 tablet by mouth Daily. 6 tablet 0   • vitamin C (ASCORBIC ACID) 500 MG tablet Take 500 mg by mouth Daily.     • azithromycin (Zithromax Z-Freddie) 250 MG tablet Take 2 tablets by mouth on day 1, then 1 tablet daily on days 2-5 6 tablet 0   • benzonatate (TESSALON) 200 MG capsule Take 1 capsule by mouth 3 (Three) Times a Day As Needed for Cough. 30 capsule 0     No current facility-administered medications for this visit.     Past  "Medical History:   Diagnosis Date   • COPD (chronic obstructive pulmonary disease) (HCC)    • Coronary artery disease    • Dyslipidemia    • Hyperlipidemia    • Hypertension    • Paroxysmal atrial fibrillation (HCC)    • Thrombocytopenia (HCC)      Family History   Problem Relation Age of Onset   • Heart disease Father         Enlarged   • Diabetes Mother    • Coronary artery disease Other      Past Surgical History:   Procedure Laterality Date   • APPENDECTOMY     • CORONARY STENT PLACEMENT     • HEMORRHOIDECTOMY     • HERNIA REPAIR      x2 in  and      Social History     Substance and Sexual Activity   Alcohol Use No     Social History     Tobacco Use   Smoking Status Former Smoker   • Packs/day: 0.25   • Years: 40.00   • Pack years: 10.00   • Quit date: 2014   • Years since quittin.9   Smokeless Tobacco Never Used   Tobacco Comment    quit one year ago     Social History     Substance and Sexual Activity   Drug Use No     Review of Systems   Constitutional: Positive for fatigue. Negative for fever.   HENT: Positive for congestion, postnasal drip, rhinorrhea and sore throat.    Respiratory: Positive for cough, shortness of breath and wheezing.         Green sputum   Cardiovascular: Negative for chest pain.        Depression Assessment Review:  PHQ-9 Total Score:    Vital Signs & Measurements Taken This Encounter  /71 (BP Location: Right arm, Patient Position: Sitting, Cuff Size: Adult)   Pulse 104   Temp 97.3 °F (36.3 °C) (Temporal)   Resp 20   Ht 189.2 cm (74.49\")   Wt 65.8 kg (145 lb)   SpO2 97%   BMI 18.37 kg/m²    SpO2 Percentage    21 1528   SpO2: 97%        Physical Exam  Vitals reviewed.   Constitutional:       General: He is not in acute distress.  HENT:      Head: Normocephalic and atraumatic.      Mouth/Throat:      Mouth: Mucous membranes are moist.      Pharynx: Posterior oropharyngeal erythema present. No oropharyngeal exudate.   Eyes:      Extraocular " Movements: Extraocular movements intact.      Conjunctiva/sclera: Conjunctivae normal.   Cardiovascular:      Rate and Rhythm: Regular rhythm. Tachycardia present.   Pulmonary:      Effort: Pulmonary effort is normal. No respiratory distress.      Breath sounds: No wheezing.      Comments: Decreased breath sounds bilaterally  Musculoskeletal:         General: No swelling.      Cervical back: Neck supple.   Lymphadenopathy:      Cervical: No cervical adenopathy.   Skin:     General: Skin is warm and dry.      Coloration: Skin is not jaundiced.   Neurological:      Mental Status: He is alert.   Psychiatric:         Mood and Affect: Mood normal.         Behavior: Behavior normal.              Assessment & Plan  Patient Active Problem List   Diagnosis   • Paroxysmal atrial fibrillation (HCC)   • Chronic obstructive pulmonary disease (HCC)   • Chronic coronary artery disease   • Hypercholesterolemia   • Essential hypertension   • Coronary artery disease   • Thrombocytopenia (HCC)       ICD-10-CM ICD-9-CM   1. Acute URI  J06.9 465.9   2. Cough  R05.9 786.2     Orders Placed This Encounter   Procedures   • XR Chest PA & Lateral     Standing Status:   Future     Standing Expiration Date:   12/7/2022     Order Specific Question:   Reason for Exam:     Answer:   cough, uri     Order Specific Question:   Release to patient     Answer:   Immediate       Meds Ordered During Visit:  New Medications Ordered This Visit   Medications   • azithromycin (Zithromax Z-Freddie) 250 MG tablet     Sig: Take 2 tablets by mouth on day 1, then 1 tablet daily on days 2-5     Dispense:  6 tablet     Refill:  0   • benzonatate (TESSALON) 200 MG capsule     Sig: Take 1 capsule by mouth 3 (Three) Times a Day As Needed for Cough.     Dispense:  30 capsule     Refill:  0       Patient declined covid and flu testing today.  Zpak and tessalon perles were discussed with patient today.  Check Cxr pa/lat for follow up. He had pna 2/2021 and has not had follow  up Cxr but agrees to update at this time.    Return in about 1 month (around 1/7/2022), or if symptoms worsen or fail to improve, for Follow up with Georgina.          Referring Provider (if known): No ref. provider found      This document has been electronically signed by Nabila Uribe DO  December 7, 2021 15:56 EST    Nabila Uribe DO, FACOI  [address] [place] [address]  [phone] (office)    Part of this note may be an electronic transcription/translation of spoken language to printed text using the Dragon Dictation System.

## 2021-12-13 ENCOUNTER — HOSPITAL ENCOUNTER (OUTPATIENT)
Dept: GENERAL RADIOLOGY | Facility: HOSPITAL | Age: 81
Discharge: HOME | End: 2021-12-13
Admitting: INTERNAL MEDICINE

## 2021-12-13 DIAGNOSIS — J06.9 ACUTE URI: ICD-10-CM

## 2021-12-13 DIAGNOSIS — R05.9 COUGH: ICD-10-CM

## 2021-12-13 PROCEDURE — 71046 X-RAY EXAM CHEST 2 VIEWS: CPT

## 2021-12-13 PROCEDURE — 71046 X-RAY EXAM CHEST 2 VIEWS: CPT | Performed by: RADIOLOGY

## 2021-12-16 ENCOUNTER — OFFICE VISIT (OUTPATIENT)
Dept: FAMILY MEDICINE CLINIC | Facility: CLINIC | Age: 81
End: 2021-12-16

## 2021-12-16 VITALS
HEIGHT: 74 IN | OXYGEN SATURATION: 96 % | WEIGHT: 149.4 LBS | HEART RATE: 74 BPM | TEMPERATURE: 97.3 F | SYSTOLIC BLOOD PRESSURE: 130 MMHG | DIASTOLIC BLOOD PRESSURE: 90 MMHG | BODY MASS INDEX: 19.17 KG/M2

## 2021-12-16 DIAGNOSIS — J41.8 MIXED SIMPLE AND MUCOPURULENT CHRONIC BRONCHITIS (HCC): Primary | ICD-10-CM

## 2021-12-16 PROCEDURE — 99214 OFFICE O/P EST MOD 30 MIN: CPT | Performed by: INTERNAL MEDICINE

## 2021-12-16 RX ORDER — ATORVASTATIN CALCIUM 10 MG/1
10 TABLET, FILM COATED ORAL DAILY
COMMUNITY

## 2021-12-16 RX ORDER — ALBUTEROL SULFATE 90 UG/1
2 AEROSOL, METERED RESPIRATORY (INHALATION) EVERY 4 HOURS PRN
Qty: 18 G | Refills: 11 | Status: SHIPPED | OUTPATIENT
Start: 2021-12-16

## 2021-12-16 RX ORDER — BUDESONIDE AND FORMOTEROL FUMARATE DIHYDRATE 160; 4.5 UG/1; UG/1
2 AEROSOL RESPIRATORY (INHALATION)
Qty: 10.2 G | Refills: 11 | Status: SHIPPED | OUTPATIENT
Start: 2021-12-16 | End: 2021-12-16

## 2021-12-16 NOTE — PROGRESS NOTES
Patient Name: Larry Santos Today's Date: 2021   Patient MRN / CSN: [id number] / [id number] Date of Encounter: 2021   Patient Age / : 81 y.o. / 1940 Encounter Provider: Nabila Uribe DO   Referring Physician: No ref. provider found          Larry is a 81 y.o. who is being seen today for COPD (Patient c/o continued trouble breathing. )      COPD  He complains of cough, shortness of breath and wheezing. Primary symptoms comments: Patient reports the recent zpak has helped with his URI symptoms. However, he reports having cough, shortness of air and some wheezing at baseline. His recent CXR showed COPD changes. He is interested in trying some medicine for this. Cough characteristics: Occasional sputum production in the mornings. Pertinent negatives include no chest pain or fever. Improvement on treatment: Patient reports being prescribed a nebulizer previously but did not get the rx filled.       Allergies include:Penicillins  Current Outpatient Medications   Medication Sig Dispense Refill   • aspirin 81 MG EC tablet Take 81 mg by mouth daily.     • atorvastatin (LIPITOR) 10 MG tablet Take 10 mg by mouth Daily.     • docusate sodium (COLACE) 250 MG capsule Take 250 mg by mouth Daily.     • ferrous gluconate 324 (37.5 Fe) MG tablet tablet Take  by mouth 2 (Two) Times a Day With Meals.     • Fexofenadine HCl (ALLERGY 24-HR PO) Take  by mouth.     • potassium chloride (K-DUR,KLOR-CON) 20 MEQ CR tablet Take 1 tablet by mouth Daily. 6 tablet 0   • vitamin C (ASCORBIC ACID) 500 MG tablet Take 500 mg by mouth Daily.     • albuterol sulfate  (90 Base) MCG/ACT inhaler Inhale 2 puffs Every 4 (Four) Hours As Needed for Wheezing or Shortness of Air. 18 g 11   • umeclidinium-vilanterol (Anoro Ellipta) 62.5-25 MCG/INH aerosol powder  inhaler Inhale 1 puff Daily. 1 each 11     No current facility-administered medications for this visit.     Past Medical History:   Diagnosis Date   • COPD (chronic  "obstructive pulmonary disease) (HCC)    • Coronary artery disease    • Dyslipidemia    • Hyperlipidemia    • Hypertension    • Paroxysmal atrial fibrillation (HCC)    • Thrombocytopenia (HCC)      Family History   Problem Relation Age of Onset   • Heart disease Father         Enlarged   • Diabetes Mother    • Coronary artery disease Other      Past Surgical History:   Procedure Laterality Date   • APPENDECTOMY     • CORONARY STENT PLACEMENT     • HEMORRHOIDECTOMY     • HERNIA REPAIR      x2 in  and      Social History     Substance and Sexual Activity   Alcohol Use No     Social History     Tobacco Use   Smoking Status Former Smoker   • Packs/day: 0.25   • Years: 40.00   • Pack years: 10.00   • Quit date: 2014   • Years since quittin.9   Smokeless Tobacco Never Used   Tobacco Comment    quit one year ago     Social History     Substance and Sexual Activity   Drug Use No     Review of Systems   Constitutional: Negative for fever.   HENT: Negative for congestion.    Respiratory: Positive for cough, shortness of breath and wheezing.    Cardiovascular: Negative for chest pain.   Gastrointestinal: Negative for abdominal pain.        Depression Assessment Review:  PHQ-9 Total Score:    Vital Signs & Measurements Taken This Encounter  /90 (BP Location: Left arm, Patient Position: Sitting, Cuff Size: Adult)   Pulse 74   Temp 97.3 °F (36.3 °C) (Temporal)   Ht 189.2 cm (74.49\")   Wt 67.8 kg (149 lb 6.4 oz)   SpO2 96%   BMI 18.93 kg/m²    SpO2 Percentage    21 1302   SpO2: 96%        Physical Exam  Vitals reviewed.   Constitutional:       General: He is not in acute distress.  HENT:      Head: Normocephalic and atraumatic.   Cardiovascular:      Rate and Rhythm: Normal rate and regular rhythm.   Pulmonary:      Effort: Pulmonary effort is normal. No respiratory distress.      Comments: Faint expiratory wheezing  Skin:     General: Skin is warm and dry.      Coloration: Skin is not " jaundiced.   Neurological:      Mental Status: He is alert.   Psychiatric:         Mood and Affect: Mood normal.         Behavior: Behavior normal.              Assessment & Plan  Patient Active Problem List   Diagnosis   • Paroxysmal atrial fibrillation (HCC)   • Chronic obstructive pulmonary disease (HCC)   • Chronic coronary artery disease   • Hypercholesterolemia   • Essential hypertension   • Coronary artery disease   • Thrombocytopenia (HCC)       ICD-10-CM ICD-9-CM   1. Mixed simple and mucopurulent chronic bronchitis (HCC)  J41.8 491.1     No orders of the defined types were placed in this encounter.      Meds Ordered During Visit:  New Medications Ordered This Visit   Medications   • albuterol sulfate  (90 Base) MCG/ACT inhaler     Sig: Inhale 2 puffs Every 4 (Four) Hours As Needed for Wheezing or Shortness of Air.     Dispense:  18 g     Refill:  11   • umeclidinium-vilanterol (Anoro Ellipta) 62.5-25 MCG/INH aerosol powder  inhaler     Sig: Inhale 1 puff Daily.     Dispense:  1 each     Refill:  11       CXR report discussed with patient today.  I discussed maintenance and rescue inhaler therapy. Initially, I sent in Symbicort but pharmacy notified us that insurance would cover anoro in its place so the maintenance regimen was changed to anoro. Albuterol prn discussed with patient.   I discussed getting PFTs done but patient declines at this time.   I commended patient on tobacco cessation and encouraged him to continue this.       Return if symptoms worsen or fail to improve, for Next scheduled follow up with Georgina.          Referring Provider (if known): No ref. provider found      This document has been electronically signed by Nabila Uribe DO  December 16, 2021 13:44 EST    Nabila Uribe DO, FACOI  [address] [place] [address]  [phone] (office)    Part of this note may be an electronic transcription/translation of spoken language to printed text using the Dragon  Dictation System.

## 2022-03-17 ENCOUNTER — TELEPHONE (OUTPATIENT)
Dept: FAMILY MEDICINE CLINIC | Facility: CLINIC | Age: 82
End: 2022-03-17

## 2022-04-21 ENCOUNTER — TELEPHONE (OUTPATIENT)
Dept: FAMILY MEDICINE CLINIC | Facility: CLINIC | Age: 82
End: 2022-04-21

## 2022-10-06 ENCOUNTER — CLINICAL SUPPORT (OUTPATIENT)
Dept: FAMILY MEDICINE CLINIC | Facility: CLINIC | Age: 82
End: 2022-10-06

## 2022-10-06 DIAGNOSIS — Z23 FLU VACCINE NEED: Primary | ICD-10-CM

## 2022-10-06 PROCEDURE — 90662 IIV NO PRSV INCREASED AG IM: CPT | Performed by: INTERNAL MEDICINE

## 2022-10-06 PROCEDURE — G0008 ADMIN INFLUENZA VIRUS VAC: HCPCS | Performed by: INTERNAL MEDICINE

## 2023-08-31 ENCOUNTER — OFFICE VISIT (OUTPATIENT)
Dept: FAMILY MEDICINE CLINIC | Facility: CLINIC | Age: 83
End: 2023-08-31
Payer: MEDICARE

## 2023-08-31 VITALS
OXYGEN SATURATION: 93 % | SYSTOLIC BLOOD PRESSURE: 118 MMHG | BODY MASS INDEX: 19.38 KG/M2 | WEIGHT: 151 LBS | HEART RATE: 91 BPM | RESPIRATION RATE: 16 BRPM | DIASTOLIC BLOOD PRESSURE: 70 MMHG | TEMPERATURE: 97.5 F | HEIGHT: 74 IN

## 2023-08-31 DIAGNOSIS — J20.9 ACUTE BRONCHITIS, UNSPECIFIED ORGANISM: Primary | ICD-10-CM

## 2023-08-31 PROCEDURE — 99213 OFFICE O/P EST LOW 20 MIN: CPT | Performed by: NURSE PRACTITIONER

## 2023-08-31 PROCEDURE — 1160F RVW MEDS BY RX/DR IN RCRD: CPT | Performed by: NURSE PRACTITIONER

## 2023-08-31 PROCEDURE — 3074F SYST BP LT 130 MM HG: CPT | Performed by: NURSE PRACTITIONER

## 2023-08-31 PROCEDURE — 1159F MED LIST DOCD IN RCRD: CPT | Performed by: NURSE PRACTITIONER

## 2023-08-31 PROCEDURE — 3078F DIAST BP <80 MM HG: CPT | Performed by: NURSE PRACTITIONER

## 2023-08-31 RX ORDER — AZITHROMYCIN 250 MG/1
TABLET, FILM COATED ORAL
Qty: 6 TABLET | Refills: 0 | Status: SHIPPED | OUTPATIENT
Start: 2023-08-31

## 2023-08-31 RX ORDER — ALBUTEROL SULFATE 90 UG/1
2 AEROSOL, METERED RESPIRATORY (INHALATION) EVERY 4 HOURS PRN
Qty: 8 G | Refills: 0 | Status: SHIPPED | OUTPATIENT
Start: 2023-08-31

## 2023-08-31 NOTE — PROGRESS NOTES
"Subjective   Larry Santos is a 82 y.o. male.     Chief Complaint   Patient presents with    URI       History of Present Illness  He presents with c/o coughing up green sputum for two weeks. He denies fever and chills. He has not taken any medicine or inhalers. He does have a runny nose. He states he does not have an inhaler or a nebulizer machine.     The following portions of the patient's history were reviewed and updated as appropriate: allergies, current medications, past family history, past medical history, past social history, past surgical history and problem list.    Review of Systems   Constitutional:  Negative for fever and unexpected weight change.   HENT:  Positive for rhinorrhea. Negative for ear pain, sore throat and trouble swallowing.    Respiratory:  Positive for cough and wheezing. Negative for shortness of breath.    Cardiovascular:  Negative for chest pain and palpitations.   Gastrointestinal:  Negative for abdominal pain, blood in stool, constipation, diarrhea, nausea and vomiting.   Genitourinary:  Negative for dysuria and hematuria.   Allergic/Immunologic: Positive for environmental allergies.   Neurological:  Negative for dizziness and headaches.   Psychiatric/Behavioral:  Negative for suicidal ideas.      Objective     /70 (BP Location: Right arm, Patient Position: Sitting, Cuff Size: Adult)   Pulse 91   Temp 97.5 øF (36.4 øC) (Temporal)   Resp 16   Ht 188 cm (74\")   Wt 68.5 kg (151 lb)   SpO2 93%   BMI 19.39 kg/mý     Physical Exam  Vitals reviewed.   Constitutional:       General: He is not in acute distress.     Appearance: He is well-developed. He is not diaphoretic.   HENT:      Head: Normocephalic and atraumatic.      Ears:      Comments: Bilateral hearing aides     Nose: Nose normal.      Right Sinus: No maxillary sinus tenderness or frontal sinus tenderness.      Left Sinus: No maxillary sinus tenderness or frontal sinus tenderness.      Mouth/Throat:      Pharynx: " Uvula midline.   Eyes:      General: Lids are normal.      Conjunctiva/sclera: Conjunctivae normal.      Pupils: Pupils are equal, round, and reactive to light.   Neck:      Trachea: Trachea normal. No tracheal tenderness or tracheal deviation.   Cardiovascular:      Rate and Rhythm: Normal rate. Rhythm regularly irregular.      Heart sounds: Normal heart sounds, S1 normal and S2 normal. No murmur heard.    No friction rub. No gallop.   Pulmonary:      Effort: Pulmonary effort is normal. No respiratory distress.      Breath sounds: Examination of the right-upper field reveals wheezing. Examination of the left-upper field reveals wheezing. Wheezing present.   Abdominal:      General: Bowel sounds are normal. There is no distension.      Palpations: Abdomen is soft.      Tenderness: There is no abdominal tenderness.   Skin:     General: Skin is warm and dry.   Neurological:      Mental Status: He is alert and oriented to person, place, and time.   Psychiatric:         Behavior: Behavior normal.         Thought Content: Thought content normal.         Judgment: Judgment normal.       Current Outpatient Medications   Medication Sig Dispense Refill    aspirin 81 MG EC tablet Take 1 tablet by mouth Daily.      atorvastatin (LIPITOR) 10 MG tablet Take 1 tablet by mouth Daily.      docusate sodium (COLACE) 250 MG capsule Take 1 capsule by mouth Daily.      ferrous gluconate 324 (37.5 Fe) MG tablet tablet Take  by mouth 2 (Two) Times a Day With Meals.      Fexofenadine HCl (ALLERGY 24-HR PO) Take  by mouth.      potassium chloride (K-DUR,KLOR-CON) 20 MEQ CR tablet Take 1 tablet by mouth Daily. 6 tablet 0    umeclidinium-vilanterol (Anoro Ellipta) 62.5-25 MCG/INH aerosol powder  inhaler Inhale 1 puff Daily. 1 each 11    vitamin C (ASCORBIC ACID) 500 MG tablet Take 1 tablet by mouth Daily.      albuterol sulfate  (90 Base) MCG/ACT inhaler Inhale 2 puffs Every 4 (Four) Hours As Needed for Wheezing. 8 g 0    azithromycin  (Zithromax Z-Freddie) 250 MG tablet Take 2 tablets the first day, then 1 tablet daily for 4 days. 6 tablet 0     No current facility-administered medications for this visit.            Assessment & Plan     Problem List Items Addressed This Visit    None  Visit Diagnoses       Acute bronchitis, unspecified organism    -  Primary    Relevant Medications    azithromycin (Zithromax Z-Freddie) 250 MG tablet    albuterol sulfate  (90 Base) MCG/ACT inhaler              ICD-10-CM ICD-9-CM   1. Acute bronchitis, unspecified organism  J20.9 466.0       Plan: Azithromycin and albuterol ordered for acute bronchitis. He declines nebulizer treatments. He declines covid test. Follow up with Dr. Uribe next week if no better. [facility]    @Body mass index is 19.39 kg/mý.              Understands disease processes and need for medications.  Understands reasons for urgent and emergent care.  Patient (& family) verbalized agreement for treatment plan.   Emotional support and active listening provided.  Patient provided time to verbalize feelings.           BMI is within normal parameters. No other follow-up for BMI required.      This document has been electronically signed by ISAAC Thrasher   August 31, 2023 09:49 EDT

## 2023-10-03 ENCOUNTER — FLU SHOT (OUTPATIENT)
Dept: FAMILY MEDICINE CLINIC | Facility: CLINIC | Age: 83
End: 2023-10-03
Payer: MEDICARE

## 2023-10-03 DIAGNOSIS — Z23 NEED FOR INFLUENZA VACCINATION: Primary | ICD-10-CM

## 2023-10-03 PROCEDURE — 90662 IIV NO PRSV INCREASED AG IM: CPT | Performed by: INTERNAL MEDICINE

## 2023-10-03 PROCEDURE — G0008 ADMIN INFLUENZA VIRUS VAC: HCPCS | Performed by: INTERNAL MEDICINE

## 2023-10-03 NOTE — PROGRESS NOTES
Immunization  Immunization performed in right deltoid by Soledad Quinones MA. Patient tolerated the procedure well without complications.  10/03/23   Soledad Quinones MA

## 2024-01-03 ENCOUNTER — OFFICE VISIT (OUTPATIENT)
Dept: FAMILY MEDICINE CLINIC | Facility: CLINIC | Age: 84
End: 2024-01-03
Payer: MEDICARE

## 2024-01-03 VITALS
HEART RATE: 67 BPM | WEIGHT: 154 LBS | TEMPERATURE: 97.3 F | DIASTOLIC BLOOD PRESSURE: 84 MMHG | BODY MASS INDEX: 19.76 KG/M2 | HEIGHT: 74 IN | OXYGEN SATURATION: 94 % | SYSTOLIC BLOOD PRESSURE: 116 MMHG

## 2024-01-03 DIAGNOSIS — J06.9 ACUTE URI: ICD-10-CM

## 2024-01-03 DIAGNOSIS — J20.9 ACUTE BRONCHITIS, UNSPECIFIED ORGANISM: Primary | ICD-10-CM

## 2024-01-03 RX ORDER — METHYLPREDNISOLONE 4 MG/1
TABLET ORAL
Qty: 21 TABLET | Refills: 0 | Status: SHIPPED | OUTPATIENT
Start: 2024-01-03

## 2024-01-03 RX ORDER — AZITHROMYCIN 250 MG/1
TABLET, FILM COATED ORAL
Qty: 6 TABLET | Refills: 0 | Status: SHIPPED | OUTPATIENT
Start: 2024-01-03

## 2024-01-03 NOTE — PROGRESS NOTES
Patient Name: Larry Santos Today's Date: 1/3/2024   Patient MRN / CSN: [id number] / [id number] Date of Encounter: 1/3/2024   Patient Age / : 83 y.o. / 1940 Encounter Provider: ISAAC Gandhi   Referring Physician: No ref. provider found          Larry is a 83 y.o. male who is being seen today for Cough (DECLINED TO BE TESTED FOR COVID AND FLU ), Sinusitis, and Sore Throat      Cough  This is a new problem. The current episode started yesterday. The problem has been worsening. The problem occurs every few minutes. The cough is Productive of sputum. Associated symptoms include rhinorrhea, a sore throat and wheezing. He has tried nothing for the symptoms. The treatment provided no relief.       Allergies include:Penicillins  Current Outpatient Medications   Medication Sig Dispense Refill    albuterol sulfate  (90 Base) MCG/ACT inhaler Inhale 2 puffs Every 4 (Four) Hours As Needed for Wheezing. 8 g 0    aspirin 81 MG EC tablet Take 1 tablet by mouth Daily.      atorvastatin (LIPITOR) 10 MG tablet Take 1 tablet by mouth Daily.      docusate sodium (COLACE) 250 MG capsule Take 1 capsule by mouth Daily.      ferrous gluconate 324 (37.5 Fe) MG tablet tablet Take  by mouth 2 (Two) Times a Day With Meals.      Fexofenadine HCl (ALLERGY 24-HR PO) Take  by mouth.      potassium chloride (K-DUR,KLOR-CON) 20 MEQ CR tablet Take 1 tablet by mouth Daily. 6 tablet 0    umeclidinium-vilanterol (Anoro Ellipta) 62.5-25 MCG/INH aerosol powder  inhaler Inhale 1 puff Daily. 1 each 11    vitamin C (ASCORBIC ACID) 500 MG tablet Take 1 tablet by mouth Daily.      azithromycin (Zithromax Z-Freddie) 250 MG tablet Take 2 tablets by mouth on day 1, then 1 tablet daily on days 2-5 6 tablet 0    methylPREDNISolone (MEDROL) 4 MG dose pack Take as directed on package instructions. 21 tablet 0     No current facility-administered medications for this visit.     Past Medical History:   Diagnosis Date    COPD (chronic obstructive  "pulmonary disease)     Coronary artery disease     Dyslipidemia     Hyperlipidemia     Hypertension     Paroxysmal atrial fibrillation     Thrombocytopenia      Family History   Problem Relation Age of Onset    Heart disease Father         Enlarged    Diabetes Mother     Coronary artery disease Other      Past Surgical History:   Procedure Laterality Date    APPENDECTOMY  1957    CORONARY STENT PLACEMENT  2003    HEMORRHOIDECTOMY  1987    HERNIA REPAIR      x2 in 1980 and 1990     Social History     Substance and Sexual Activity   Alcohol Use No     Social History     Tobacco Use   Smoking Status Former    Packs/day: 0.25    Years: 40.00    Additional pack years: 0.00    Total pack years: 10.00    Types: Cigarettes    Quit date: 1/1/2014    Years since quitting: 10.0   Smokeless Tobacco Never   Tobacco Comments    quit one year ago     Social History     Substance and Sexual Activity   Drug Use No     Review of Systems   HENT:  Positive for congestion, rhinorrhea and sore throat.    Respiratory:  Positive for wheezing.    All other systems reviewed and are negative.       Depression Assessment Review:  PHQ-9 Total Score:    Vital Signs & Measurements Taken This Encounter  /84 (BP Location: Left arm, Patient Position: Sitting, Cuff Size: Adult)   Pulse 67   Temp 97.3 °F (36.3 °C) (Temporal)   Ht 188 cm (74.02\")   Wt 69.9 kg (154 lb)   SpO2 94%   BMI 19.76 kg/m²    SpO2 Percentage    01/03/24 1517   SpO2: 94%        BMI is within normal parameters. No other follow-up for BMI required.      Physical Exam  Constitutional:       Appearance: Normal appearance.   HENT:      Nose: Congestion present.      Mouth/Throat:      Mouth: Mucous membranes are moist.   Eyes:      Pupils: Pupils are equal, round, and reactive to light.   Cardiovascular:      Rate and Rhythm: Normal rate and regular rhythm.      Pulses: Normal pulses.      Heart sounds: Normal heart sounds.   Pulmonary:      Breath sounds: Wheezing " present.   Abdominal:      Palpations: Abdomen is soft.   Musculoskeletal:         General: Normal range of motion.   Skin:     General: Skin is warm.   Neurological:      General: No focal deficit present.      Mental Status: He is alert and oriented to person, place, and time.   Psychiatric:         Mood and Affect: Mood normal.         Behavior: Behavior normal.          Fall Risk Assessment:  Fall Risk Assessment was completed, and patient is at high risk for falls.    Assessment & Plan  Patient Active Problem List   Diagnosis    Paroxysmal atrial fibrillation    Chronic obstructive pulmonary disease    Chronic coronary artery disease    Hypercholesterolemia    Essential hypertension    Coronary artery disease    Thrombocytopenia       ICD-10-CM ICD-9-CM   1. Acute bronchitis, unspecified organism  J20.9 466.0   2. Acute URI  J06.9 465.9     No orders of the defined types were placed in this encounter.      Meds Ordered During Visit:  New Medications Ordered This Visit   Medications    azithromycin (Zithromax Z-Freddie) 250 MG tablet     Sig: Take 2 tablets by mouth on day 1, then 1 tablet daily on days 2-5     Dispense:  6 tablet     Refill:  0    methylPREDNISolone (MEDROL) 4 MG dose pack     Sig: Take as directed on package instructions.     Dispense:  21 tablet     Refill:  0     -- He presents today with complaints of cough, congestion, and wheezing.  He states is been going on for couple days.  He declines COVID/flu testing in office today.  For this bronchitis I will start her on a Z-Freddie.  I will also trial a Medrol Dosepak.      This document has been electronically signed by ISAAC Gandhi  January 3, 2024 15:40 ISAAC Moody  [address] [place] [address]  [phone] (office)    Part of this note may be an electronic transcription/translation of spoken language to printed text using the Dragon Dictation System.

## 2024-08-28 ENCOUNTER — OFFICE VISIT (OUTPATIENT)
Dept: FAMILY MEDICINE CLINIC | Facility: CLINIC | Age: 84
End: 2024-08-28
Payer: MEDICARE

## 2024-08-28 VITALS
HEART RATE: 68 BPM | DIASTOLIC BLOOD PRESSURE: 80 MMHG | BODY MASS INDEX: 18.35 KG/M2 | RESPIRATION RATE: 18 BRPM | HEIGHT: 74 IN | SYSTOLIC BLOOD PRESSURE: 110 MMHG | TEMPERATURE: 98.9 F | WEIGHT: 143 LBS | OXYGEN SATURATION: 92 %

## 2024-08-28 DIAGNOSIS — J40 BRONCHITIS: ICD-10-CM

## 2024-08-28 DIAGNOSIS — J06.9 ACUTE URI: Primary | ICD-10-CM

## 2024-08-28 PROCEDURE — 3074F SYST BP LT 130 MM HG: CPT | Performed by: NURSE PRACTITIONER

## 2024-08-28 PROCEDURE — 99213 OFFICE O/P EST LOW 20 MIN: CPT | Performed by: NURSE PRACTITIONER

## 2024-08-28 PROCEDURE — 1125F AMNT PAIN NOTED PAIN PRSNT: CPT | Performed by: NURSE PRACTITIONER

## 2024-08-28 PROCEDURE — 3079F DIAST BP 80-89 MM HG: CPT | Performed by: NURSE PRACTITIONER

## 2024-08-28 RX ORDER — METHYLPREDNISOLONE 4 MG
TABLET, DOSE PACK ORAL
Qty: 21 TABLET | Refills: 0 | Status: SHIPPED | OUTPATIENT
Start: 2024-08-28

## 2024-08-28 RX ORDER — AZITHROMYCIN 250 MG/1
TABLET, FILM COATED ORAL
Qty: 6 TABLET | Refills: 0 | Status: SHIPPED | OUTPATIENT
Start: 2024-08-28

## 2024-08-28 NOTE — PROGRESS NOTES
Patient Name: Larry Santos Today's Date: 2024   Patient MRN / CSN: [id number] / [id number] Date of Encounter: 2024   Patient Age / : 83 y.o. / 1940 Encounter Provider: ISAAC Gandhi   Referring Physician: No ref. provider found          Larry is a 83 y.o. male who is being seen today for Sore Throat and Cough (DOES NOT WANT TESTED FOR FLU OR COVID)      URI   This is a new problem. The current episode started in the past 7 days. The problem has been unchanged. There has been no fever. Associated symptoms include congestion, coughing and wheezing. The treatment provided no relief.       Allergies include:Penicillins  Current Outpatient Medications   Medication Sig Dispense Refill    albuterol sulfate  (90 Base) MCG/ACT inhaler Inhale 2 puffs Every 4 (Four) Hours As Needed for Wheezing. 8 g 0    aspirin 81 MG EC tablet Take 1 tablet by mouth Daily.      atorvastatin (LIPITOR) 10 MG tablet Take 1 tablet by mouth Daily.      azithromycin (Zithromax Z-Freddie) 250 MG tablet Take 2 tablets by mouth on day 1, then 1 tablet daily on days 2-5 6 tablet 0    docusate sodium (COLACE) 250 MG capsule Take 1 capsule by mouth Daily.      ferrous gluconate 324 (37.5 Fe) MG tablet tablet Take  by mouth 2 (Two) Times a Day With Meals.      Fexofenadine HCl (ALLERGY 24-HR PO) Take  by mouth.      methylPREDNISolone (MEDROL) 4 MG dose pack Take as directed on package instructions. 21 tablet 0    potassium chloride (K-DUR,KLOR-CON) 20 MEQ CR tablet Take 1 tablet by mouth Daily. 6 tablet 0    umeclidinium-vilanterol (Anoro Ellipta) 62.5-25 MCG/INH aerosol powder  inhaler Inhale 1 puff Daily. 1 each 11    vitamin C (ASCORBIC ACID) 500 MG tablet Take 1 tablet by mouth Daily.       No current facility-administered medications for this visit.     Past Medical History:   Diagnosis Date    COPD (chronic obstructive pulmonary disease)     Coronary artery disease     Dyslipidemia     Hyperlipidemia      "Hypertension     Paroxysmal atrial fibrillation     Thrombocytopenia      Family History   Problem Relation Age of Onset    Heart disease Father         Enlarged    Diabetes Mother     Coronary artery disease Other      Past Surgical History:   Procedure Laterality Date    APPENDECTOMY  1957    CORONARY STENT PLACEMENT  2003    HEMORRHOIDECTOMY  1987    HERNIA REPAIR      x2 in 1980 and 1990     Social History     Substance and Sexual Activity   Alcohol Use No     Social History     Tobacco Use   Smoking Status Former    Current packs/day: 0.00    Average packs/day: 0.3 packs/day for 40.0 years (10.0 ttl pk-yrs)    Types: Cigarettes    Start date: 1/1/1974    Quit date: 1/1/2014    Years since quitting: 10.6   Smokeless Tobacco Never   Tobacco Comments    quit one year ago     Social History     Substance and Sexual Activity   Drug Use No     Review of Systems   HENT:  Positive for congestion.    Respiratory:  Positive for cough and wheezing.         Depression Assessment Review:  PHQ-9 Total Score: 0  Vital Signs & Measurements Taken This Encounter  /80 (BP Location: Left arm, Patient Position: Sitting, Cuff Size: Adult)   Pulse 68   Temp 98.9 °F (37.2 °C)   Resp 18   Ht 188 cm (74.02\")   Wt 64.9 kg (143 lb)   SpO2 92%   BMI 18.35 kg/m²    SpO2 Percentage    08/28/24 1531 08/28/24 1537   SpO2: (!) 86% 92%              Physical Exam  Constitutional:       Appearance: Normal appearance.   HENT:      Head: Normocephalic and atraumatic.      Right Ear: External ear normal.      Left Ear: External ear normal.      Nose: Congestion present.      Mouth/Throat:      Mouth: Mucous membranes are moist.   Eyes:      Pupils: Pupils are equal, round, and reactive to light.   Cardiovascular:      Rate and Rhythm: Normal rate and regular rhythm.   Pulmonary:      Breath sounds: Wheezing present.   Abdominal:      General: Abdomen is flat.   Musculoskeletal:         General: Normal range of motion.   Skin:     " General: Skin is warm.   Neurological:      Mental Status: He is alert.          Fall Risk Assessment:  Fall Risk Assessment was completed, and patient is at high risk for falls.    Assessment & Plan  Patient Active Problem List   Diagnosis    Paroxysmal atrial fibrillation    Chronic obstructive pulmonary disease    Chronic coronary artery disease    Hypercholesterolemia    Essential hypertension    Coronary artery disease    Thrombocytopenia       ICD-10-CM ICD-9-CM   1. Acute URI  J06.9 465.9   2. Bronchitis  J40 490     No orders of the defined types were placed in this encounter.      Meds Ordered During Visit:  New Medications Ordered This Visit   Medications    azithromycin (Zithromax Z-Freddie) 250 MG tablet     Sig: Take 2 tablets by mouth on day 1, then 1 tablet daily on days 2-5     Dispense:  6 tablet     Refill:  0    methylPREDNISolone (MEDROL) 4 MG dose pack     Sig: Take as directed on package instructions.     Dispense:  21 tablet     Refill:  0     --He presents today with cough, congestion and wheezing.  He states has been off for couple days.  He does not wish to be tested for COVID and flu in office today.  I will start him on azithromycin and a Medrol Dosepak.    Follow-up on file with SKYLER Beasley.           This document has been electronically signed by ISAAC Gandhi  August 28, 2024 17:12 EDT    ISAAC Gandhi  [address]  [place], [place] [id number] [phone] (office)    Part of this note may be an electronic transcription/translation of spoken language to printed text using the Dragon Dictation System.

## 2024-09-03 ENCOUNTER — TRANSCRIBE ORDERS (OUTPATIENT)
Dept: ADMINISTRATIVE | Facility: HOSPITAL | Age: 84
End: 2024-09-03
Payer: MEDICARE

## 2024-09-03 ENCOUNTER — HOSPITAL ENCOUNTER (OUTPATIENT)
Dept: GENERAL RADIOLOGY | Facility: HOSPITAL | Age: 84
Discharge: HOME | End: 2024-09-03
Admitting: NURSE PRACTITIONER
Payer: MEDICARE

## 2024-09-03 DIAGNOSIS — R06.02 SOB (SHORTNESS OF BREATH): ICD-10-CM

## 2024-09-03 DIAGNOSIS — J44.9 CHRONIC OBSTRUCTIVE PULMONARY DISEASE, UNSPECIFIED COPD TYPE: ICD-10-CM

## 2024-09-03 DIAGNOSIS — R06.02 SOB (SHORTNESS OF BREATH): Primary | ICD-10-CM

## 2024-09-03 PROCEDURE — 71046 X-RAY EXAM CHEST 2 VIEWS: CPT

## 2024-10-07 ENCOUNTER — FLU SHOT (OUTPATIENT)
Dept: FAMILY MEDICINE CLINIC | Facility: CLINIC | Age: 84
End: 2024-10-07
Payer: MEDICARE

## 2024-10-07 DIAGNOSIS — Z23 NEED FOR IMMUNIZATION AGAINST INFLUENZA: Primary | ICD-10-CM

## 2024-10-07 PROCEDURE — 90662 IIV NO PRSV INCREASED AG IM: CPT | Performed by: NURSE PRACTITIONER

## 2024-10-07 PROCEDURE — G0008 ADMIN INFLUENZA VIRUS VAC: HCPCS | Performed by: NURSE PRACTITIONER

## 2024-10-07 NOTE — PROGRESS NOTES
Immunization  Immunization performed in right deltoid by Carlyn Ryder MA. Patient tolerated the procedure well without complications.  10/07/24   Carlyn Ryder MA

## 2025-08-28 ENCOUNTER — HOSPITAL ENCOUNTER (EMERGENCY)
Facility: HOSPITAL | Age: 85
Discharge: ANOTHER HOSPITAL | End: 2025-08-28
Attending: STUDENT IN AN ORGANIZED HEALTH CARE EDUCATION/TRAINING PROGRAM | Admitting: STUDENT IN AN ORGANIZED HEALTH CARE EDUCATION/TRAINING PROGRAM
Payer: OTHER GOVERNMENT

## 2025-08-28 ENCOUNTER — APPOINTMENT (OUTPATIENT)
Dept: CT IMAGING | Facility: HOSPITAL | Age: 85
End: 2025-08-28
Payer: OTHER GOVERNMENT

## 2025-08-28 VITALS
RESPIRATION RATE: 17 BRPM | DIASTOLIC BLOOD PRESSURE: 91 MMHG | WEIGHT: 155 LBS | HEIGHT: 75 IN | HEART RATE: 96 BPM | SYSTOLIC BLOOD PRESSURE: 148 MMHG | BODY MASS INDEX: 19.27 KG/M2 | TEMPERATURE: 97.6 F | OXYGEN SATURATION: 96 %

## 2025-08-28 DIAGNOSIS — N17.9 ACUTE RENAL FAILURE, UNSPECIFIED ACUTE RENAL FAILURE TYPE: Primary | ICD-10-CM

## 2025-08-28 DIAGNOSIS — R31.9 HEMATURIA, UNSPECIFIED TYPE: ICD-10-CM

## 2025-08-28 LAB
ALBUMIN SERPL-MCNC: 4 G/DL (ref 3.5–5.2)
ALBUMIN/GLOB SERPL: 2.4 G/DL
ALP SERPL-CCNC: 64 U/L (ref 39–117)
ALT SERPL W P-5'-P-CCNC: 37 U/L (ref 1–41)
ANION GAP SERPL CALCULATED.3IONS-SCNC: 12.2 MMOL/L (ref 5–15)
APTT PPP: 24.6 SECONDS (ref 22.7–32.9)
AST SERPL-CCNC: 70 U/L (ref 1–40)
BACTERIA UR QL AUTO: ABNORMAL /HPF
BASOPHILS # BLD AUTO: 0.03 10*3/MM3 (ref 0–0.2)
BASOPHILS NFR BLD AUTO: 0.2 % (ref 0–1.5)
BILIRUB SERPL-MCNC: 0.5 MG/DL (ref 0–1.2)
BILIRUB UR QL STRIP: ABNORMAL
BUN SERPL-MCNC: 66.1 MG/DL (ref 8–23)
BUN/CREAT SERPL: 31.6 (ref 7–25)
CALCIUM SPEC-SCNC: 9.4 MG/DL (ref 8.6–10.5)
CHLORIDE SERPL-SCNC: 96 MMOL/L (ref 98–107)
CLARITY UR: ABNORMAL
CO2 SERPL-SCNC: 24.8 MMOL/L (ref 22–29)
COLOR UR: ABNORMAL
CREAT SERPL-MCNC: 2.09 MG/DL (ref 0.76–1.27)
DEPRECATED RDW RBC AUTO: 47.3 FL (ref 37–54)
EGFRCR SERPLBLD CKD-EPI 2021: 30.8 ML/MIN/1.73
EOSINOPHIL # BLD AUTO: 0.01 10*3/MM3 (ref 0–0.4)
EOSINOPHIL NFR BLD AUTO: 0.1 % (ref 0.3–6.2)
ERYTHROCYTE [DISTWIDTH] IN BLOOD BY AUTOMATED COUNT: 13.8 % (ref 12.3–15.4)
GLOBULIN UR ELPH-MCNC: 1.7 GM/DL
GLUCOSE SERPL-MCNC: 106 MG/DL (ref 65–99)
GLUCOSE UR STRIP-MCNC: ABNORMAL MG/DL
HCT VFR BLD AUTO: 27.9 % (ref 37.5–51)
HGB BLD-MCNC: 9.3 G/DL (ref 13–17.7)
HGB UR QL STRIP.AUTO: ABNORMAL
HOLD SPECIMEN: NORMAL
HYALINE CASTS UR QL AUTO: ABNORMAL /LPF
IMM GRANULOCYTES # BLD AUTO: 0.04 10*3/MM3 (ref 0–0.05)
IMM GRANULOCYTES NFR BLD AUTO: 0.3 % (ref 0–0.5)
INR PPP: 1.07 (ref 0.9–1.1)
KETONES UR QL STRIP: ABNORMAL
LEUKOCYTE ESTERASE UR QL STRIP.AUTO: ABNORMAL
LYMPHOCYTES # BLD AUTO: 1.54 10*3/MM3 (ref 0.7–3.1)
LYMPHOCYTES NFR BLD AUTO: 12.5 % (ref 19.6–45.3)
MCH RBC QN AUTO: 31.1 PG (ref 26.6–33)
MCHC RBC AUTO-ENTMCNC: 33.3 G/DL (ref 31.5–35.7)
MCV RBC AUTO: 93.3 FL (ref 79–97)
MONOCYTES # BLD AUTO: 1.5 10*3/MM3 (ref 0.1–0.9)
MONOCYTES NFR BLD AUTO: 12.2 % (ref 5–12)
NEUTROPHILS NFR BLD AUTO: 74.7 % (ref 42.7–76)
NEUTROPHILS NFR BLD AUTO: 9.18 10*3/MM3 (ref 1.7–7)
NITRITE UR QL STRIP: POSITIVE
NRBC BLD AUTO-RTO: 0 /100 WBC (ref 0–0.2)
PH UR STRIP.AUTO: 8.5 [PH] (ref 5–8)
PLATELET # BLD AUTO: 147 10*3/MM3 (ref 140–450)
PMV BLD AUTO: 12.7 FL (ref 6–12)
POTASSIUM SERPL-SCNC: 4.8 MMOL/L (ref 3.5–5.2)
PROT SERPL-MCNC: 5.7 G/DL (ref 6–8.5)
PROT UR QL STRIP: ABNORMAL
PROTHROMBIN TIME: 13.9 SECONDS (ref 12–14.6)
QT INTERVAL: 368 MS
QTC INTERVAL: 474 MS
RBC # BLD AUTO: 2.99 10*6/MM3 (ref 4.14–5.8)
RBC # UR STRIP: ABNORMAL /HPF
REF LAB TEST METHOD: ABNORMAL
SODIUM SERPL-SCNC: 133 MMOL/L (ref 136–145)
SP GR UR STRIP: 1.01 (ref 1–1.03)
SQUAMOUS #/AREA URNS HPF: ABNORMAL /HPF
UROBILINOGEN UR QL STRIP: ABNORMAL
WBC # UR STRIP: ABNORMAL /HPF
WBC NRBC COR # BLD AUTO: 12.3 10*3/MM3 (ref 3.4–10.8)
WHOLE BLOOD HOLD COAG: NORMAL
WHOLE BLOOD HOLD SPECIMEN: NORMAL

## 2025-08-28 PROCEDURE — 85610 PROTHROMBIN TIME: CPT | Performed by: PHYSICIAN ASSISTANT

## 2025-08-28 PROCEDURE — 96365 THER/PROPH/DIAG IV INF INIT: CPT

## 2025-08-28 PROCEDURE — 36415 COLL VENOUS BLD VENIPUNCTURE: CPT

## 2025-08-28 PROCEDURE — 87086 URINE CULTURE/COLONY COUNT: CPT | Performed by: PHYSICIAN ASSISTANT

## 2025-08-28 PROCEDURE — 25810000003 SODIUM CHLORIDE 0.9 % SOLUTION: Performed by: PHYSICIAN ASSISTANT

## 2025-08-28 PROCEDURE — 93005 ELECTROCARDIOGRAM TRACING: CPT | Performed by: STUDENT IN AN ORGANIZED HEALTH CARE EDUCATION/TRAINING PROGRAM

## 2025-08-28 PROCEDURE — 80053 COMPREHEN METABOLIC PANEL: CPT | Performed by: PHYSICIAN ASSISTANT

## 2025-08-28 PROCEDURE — 85730 THROMBOPLASTIN TIME PARTIAL: CPT | Performed by: PHYSICIAN ASSISTANT

## 2025-08-28 PROCEDURE — 85025 COMPLETE CBC W/AUTO DIFF WBC: CPT | Performed by: PHYSICIAN ASSISTANT

## 2025-08-28 PROCEDURE — 96361 HYDRATE IV INFUSION ADD-ON: CPT

## 2025-08-28 PROCEDURE — 74176 CT ABD & PELVIS W/O CONTRAST: CPT

## 2025-08-28 PROCEDURE — 99285 EMERGENCY DEPT VISIT HI MDM: CPT

## 2025-08-28 PROCEDURE — 25010000002 AZTREONAM PER 500 MG: Performed by: PHYSICIAN ASSISTANT

## 2025-08-28 PROCEDURE — 81001 URINALYSIS AUTO W/SCOPE: CPT | Performed by: PHYSICIAN ASSISTANT

## 2025-08-28 RX ORDER — SODIUM CHLORIDE 9 MG/ML
100 INJECTION, SOLUTION INTRAVENOUS CONTINUOUS
Status: DISCONTINUED | OUTPATIENT
Start: 2025-08-28 | End: 2025-08-28 | Stop reason: HOSPADM

## 2025-08-28 RX ORDER — VANCOMYCIN/0.9 % SOD CHLORIDE 1.5G/250ML
20 PLASTIC BAG, INJECTION (ML) INTRAVENOUS ONCE
Status: DISCONTINUED | OUTPATIENT
Start: 2025-08-28 | End: 2025-08-28

## 2025-08-28 RX ADMIN — SODIUM CHLORIDE 1000 ML: 9 INJECTION, SOLUTION INTRAVENOUS at 17:41

## 2025-08-28 RX ADMIN — AZTREONAM 2 G: 2 INJECTION, POWDER, LYOPHILIZED, FOR SOLUTION INTRAMUSCULAR; INTRAVENOUS at 19:49

## 2025-08-28 RX ADMIN — SODIUM CHLORIDE 100 ML/HR: 9 INJECTION, SOLUTION INTRAVENOUS at 19:49

## 2025-08-30 LAB — BACTERIA SPEC AEROBE CULT: NORMAL
